# Patient Record
Sex: MALE | Race: WHITE | Employment: FULL TIME | URBAN - METROPOLITAN AREA
[De-identification: names, ages, dates, MRNs, and addresses within clinical notes are randomized per-mention and may not be internally consistent; named-entity substitution may affect disease eponyms.]

---

## 2017-03-09 ENCOUNTER — HOSPITAL ENCOUNTER (EMERGENCY)
Facility: HOSPITAL | Age: 22
Discharge: HOME | End: 2017-03-09
Attending: EMERGENCY MEDICINE
Payer: COMMERCIAL

## 2017-03-09 VITALS
TEMPERATURE: 97 F | HEART RATE: 63 BPM | RESPIRATION RATE: 18 BRPM | DIASTOLIC BLOOD PRESSURE: 51 MMHG | WEIGHT: 195 LBS | OXYGEN SATURATION: 98 % | HEIGHT: 73 IN | SYSTOLIC BLOOD PRESSURE: 127 MMHG | BODY MASS INDEX: 25.84 KG/M2

## 2017-03-09 DIAGNOSIS — M54.41 ACUTE RIGHT-SIDED LOW BACK PAIN WITH RIGHT-SIDED SCIATICA: Primary | ICD-10-CM

## 2017-03-09 PROCEDURE — 99283 EMERGENCY DEPT VISIT LOW MDM: CPT

## 2017-03-09 RX ORDER — PREDNISONE 20 MG/1
60 TABLET ORAL ONCE
Status: COMPLETED | OUTPATIENT
Start: 2017-03-09 | End: 2017-03-09

## 2017-03-09 RX ORDER — PREDNISONE 20 MG/1
60 TABLET ORAL DAILY
Qty: 15 TABLET | Refills: 0 | Status: SHIPPED | OUTPATIENT
Start: 2017-03-09 | End: 2017-03-14

## 2017-03-09 RX ORDER — CYCLOBENZAPRINE HCL 10 MG
10 TABLET ORAL 3 TIMES DAILY PRN
Qty: 20 TABLET | Refills: 0 | Status: SHIPPED | OUTPATIENT
Start: 2017-03-09 | End: 2017-03-16

## 2017-03-09 RX ORDER — IBUPROFEN 600 MG/1
600 TABLET ORAL ONCE
Status: COMPLETED | OUTPATIENT
Start: 2017-03-09 | End: 2017-03-09

## 2017-03-09 RX ORDER — HYDROCODONE BITARTRATE AND ACETAMINOPHEN 5; 325 MG/1; MG/1
1 TABLET ORAL ONCE
Status: COMPLETED | OUTPATIENT
Start: 2017-03-09 | End: 2017-03-09

## 2017-03-09 RX ORDER — CYCLOBENZAPRINE HCL 10 MG
10 TABLET ORAL ONCE
Status: COMPLETED | OUTPATIENT
Start: 2017-03-09 | End: 2017-03-09

## 2017-03-09 RX ORDER — IBUPROFEN 600 MG/1
600 TABLET ORAL EVERY 8 HOURS PRN
Qty: 30 TABLET | Refills: 0 | Status: SHIPPED | OUTPATIENT
Start: 2017-03-09 | End: 2017-03-16

## 2017-03-09 RX ORDER — HYDROCODONE BITARTRATE AND ACETAMINOPHEN 7.5; 325 MG/1; MG/1
1 TABLET ORAL EVERY 6 HOURS PRN
Qty: 20 TABLET | Refills: 0 | Status: SHIPPED | OUTPATIENT
Start: 2017-03-09 | End: 2017-05-28 | Stop reason: ALTCHOICE

## 2017-03-09 NOTE — ED NOTES
Patient medicated as ordered. AVS reviewed. D/C medications gone over with patient, mom and family. Patient and Mom verbalized understanding of D/C instructions. Patient left ambulatory with steady gait in good, stable condition. Mom to drive patient home.

## 2017-03-09 NOTE — ED PROVIDER NOTES
Patient Seen in: [facility] AND [facility] Emergency Department    History   Patient presents with:  Back Pain (musculoskeletal)    Stated Complaint: Back pain from working out yesterday    HPI    50-year-old male presents for complaint of right lower back pain All other systems reviewed and are negative. Positive for stated complaint: Back pain from working out yesterday  Other systems are as noted in HPI. Constitutional and vital signs reviewed.       All other systems reviewed and negative except as noted There is no fever, no bowel or bladder incontinence or urinary retention. No recent spinal surgery or other invasive procedures. No history of IV drug abuse, no saddle anesthesia.  Spinal epidural abscess and cauda equina are low on my differential.  [place]

## 2017-03-09 NOTE — ED INITIAL ASSESSMENT (HPI)
C/o lower right sided back pain x 2 days after lifting weights at the gym---difficulty walking due to pain    Took norco PTA

## 2017-05-28 ENCOUNTER — HOSPITAL ENCOUNTER (OUTPATIENT)
Facility: HOSPITAL | Age: 22
Setting detail: OBSERVATION
Discharge: HOME | End: 2017-05-30
Attending: EMERGENCY MEDICINE | Admitting: HOSPITALIST
Payer: COMMERCIAL

## 2017-05-28 ENCOUNTER — APPOINTMENT (OUTPATIENT)
Dept: CT IMAGING | Facility: HOSPITAL | Age: 22
End: 2017-05-28
Attending: EMERGENCY MEDICINE
Payer: COMMERCIAL

## 2017-05-28 DIAGNOSIS — R11.2 INTRACTABLE VOMITING WITH NAUSEA, UNSPECIFIED VOMITING TYPE: ICD-10-CM

## 2017-05-28 DIAGNOSIS — K52.9 ACUTE COLITIS: Primary | ICD-10-CM

## 2017-05-28 PROCEDURE — 99220 INITIAL OBSERVATION CARE,LEVL III: CPT | Performed by: HOSPITALIST

## 2017-05-28 PROCEDURE — 74177 CT ABD & PELVIS W/CONTRAST: CPT | Performed by: EMERGENCY MEDICINE

## 2017-05-28 RX ORDER — ONDANSETRON 2 MG/ML
INJECTION INTRAMUSCULAR; INTRAVENOUS
Status: COMPLETED
Start: 2017-05-28 | End: 2017-05-28

## 2017-05-28 RX ORDER — HYDROMORPHONE HYDROCHLORIDE 1 MG/ML
0.5 INJECTION, SOLUTION INTRAMUSCULAR; INTRAVENOUS; SUBCUTANEOUS ONCE
Status: COMPLETED | OUTPATIENT
Start: 2017-05-28 | End: 2017-05-28

## 2017-05-28 RX ORDER — ONDANSETRON 2 MG/ML
4 INJECTION INTRAMUSCULAR; INTRAVENOUS ONCE
Status: COMPLETED | OUTPATIENT
Start: 2017-05-28 | End: 2017-05-28

## 2017-05-28 RX ORDER — METOCLOPRAMIDE HYDROCHLORIDE 5 MG/ML
10 INJECTION INTRAMUSCULAR; INTRAVENOUS EVERY 6 HOURS PRN
Status: DISCONTINUED | OUTPATIENT
Start: 2017-05-28 | End: 2017-05-30

## 2017-05-28 RX ORDER — ENOXAPARIN SODIUM 100 MG/ML
40 INJECTION SUBCUTANEOUS DAILY
Status: DISCONTINUED | OUTPATIENT
Start: 2017-05-28 | End: 2017-05-30

## 2017-05-28 RX ORDER — CHOLECALCIFEROL (VITAMIN D3) 125 MCG
5 CAPSULE ORAL NIGHTLY PRN
COMMUNITY

## 2017-05-28 RX ORDER — SODIUM CHLORIDE 9 MG/ML
INJECTION, SOLUTION INTRAVENOUS CONTINUOUS
Status: DISCONTINUED | OUTPATIENT
Start: 2017-05-28 | End: 2017-05-30

## 2017-05-28 RX ORDER — ACETAMINOPHEN 325 MG/1
650 TABLET ORAL EVERY 6 HOURS PRN
Status: DISCONTINUED | OUTPATIENT
Start: 2017-05-28 | End: 2017-05-30

## 2017-05-28 RX ORDER — HYDROCODONE BITARTRATE AND ACETAMINOPHEN 5; 325 MG/1; MG/1
1 TABLET ORAL EVERY 6 HOURS PRN
Status: DISCONTINUED | OUTPATIENT
Start: 2017-05-28 | End: 2017-05-30

## 2017-05-28 RX ORDER — SODIUM CHLORIDE 9 MG/ML
INJECTION, SOLUTION INTRAVENOUS ONCE
Status: COMPLETED | OUTPATIENT
Start: 2017-05-28 | End: 2017-05-28

## 2017-05-28 RX ORDER — PANTOPRAZOLE SODIUM 40 MG/1
40 TABLET, DELAYED RELEASE ORAL
Status: DISCONTINUED | OUTPATIENT
Start: 2017-05-29 | End: 2017-05-30

## 2017-05-28 RX ORDER — ONDANSETRON 2 MG/ML
4 INJECTION INTRAMUSCULAR; INTRAVENOUS EVERY 6 HOURS PRN
Status: DISCONTINUED | OUTPATIENT
Start: 2017-05-28 | End: 2017-05-29

## 2017-05-28 NOTE — ED INITIAL ASSESSMENT (HPI)
Pt reports drinking 5-6 beers yesterday, reports N/V and abd pain since last evening. Reports having chinese food 2 nights ago. Denies diarrhea.

## 2017-05-28 NOTE — PROGRESS NOTES
Pt seen in the ED rm 42. Admission process started. Floor RN to F/U with skin check and admission orders. Lives at home with mom and works at [employer]. Came to ED after vomiting and abd pain all night.

## 2017-05-28 NOTE — ED PROVIDER NOTES
Patient Seen in: [facility] AND [facility] Emergency Department    History   Patient presents with:  Abdomen/Flank Pain (GI/)    Stated Complaint: vomiting, abd pain    HPI    The patient is a 50-year-old male with past history of appendectomy who presents now tenderness  Eyes: Nonicteric sclera, no conjunctival injection  ENT: TMs are clear and flat bilaterally.   There is no posterior pharyngeal erythema  Chest: Clear to auscultation, no tenderness  Cardiovascular: Regular rate and rhythm without murmur  Abdome CBC WITH DIFFERENTIAL WITH PLATELET    Narrative: The following orders were created for panel order CBC WITH DIFFERENTIAL WITH PLATELET.   Procedure                               Abnormality         Status                     --------- better. We are awaiting CT scan. At 12 PM, after taking a small amount of p.o. fluids, the patient has recurrent nausea and persistent epigastric tenderness to palpation. Patient CT scan demonstrates pancolitis.   Will discuss with the on-call medical

## 2017-05-28 NOTE — H&P
[facility] Patient Status:  Emergency    1995 MRN [id number]   Location [address] Attending Jane Martinez MD   [facility] Day # 0 PCP Unknown Pcp     Date:   Negative  Cardiovascular: Negative  Gastrointestinal: Abdominal pain nausea vomiting  Genitourinary:  Negative  Endocrine:  Negative. Immunologic:  Negative. Musculoskeletal:  Negative. Integumentary:  Negative. Neurologic:  Negative.   Psychiatric:  Ne encounter. Assessment and Plan:    Acute pancolitis   No signs of diarrhea at this time, will continue IV fluids for now hold antibiotics unless patient becomes febrile.     Intractable emesis  We will start patient on Zofran and Reglan as needed, initia

## 2017-05-29 PROCEDURE — 99226 SUBSEQUENT OBSERVATION CARE: CPT | Performed by: HOSPITALIST

## 2017-05-29 RX ORDER — ONDANSETRON 2 MG/ML
4 INJECTION INTRAMUSCULAR; INTRAVENOUS EVERY 4 HOURS PRN
Status: DISCONTINUED | OUTPATIENT
Start: 2017-05-29 | End: 2017-05-30

## 2017-05-29 RX ORDER — 0.9 % SODIUM CHLORIDE 0.9 %
VIAL (ML) INJECTION
Status: COMPLETED
Start: 2017-05-29 | End: 2017-05-29

## 2017-05-29 NOTE — PLAN OF CARE
Problem: Patient/Family Goals  Goal: Patient/Family Long Term Goal  Patient’s Long Term Goal: To tolerate a normal diet    Interventions:  - Take medications as prescribed  - Follow up with physician as instructed  - See additional Care Plan goals for spec cultural and social influences on pain and pain management  - Manage/alleviate anxiety  - Utilize distraction and/or relaxation techniques  - Monitor for opioid side effects  - Notify MD/LIP if interventions unsuccessful or patient reports new pain  Outcom

## 2017-05-29 NOTE — PROGRESS NOTES
[facility]  Hospitalist Progress  Note     Trixie Carrizales Patient Status:  Observation      24year old CSN [id number]   Location 514/514-A Attending Baldemar Shaikh MD   Hosp Day # 1 PCP Unknown Pcp     ASSESSMENT/PLAN    Acute c K  4.3  4.4   CL  108  107   CO2  19*  25   ALKPHO  62   --    AST  29   --    ALT  22   --    BILT  1.5*   --    TP  8.6*   --

## 2017-05-29 NOTE — PLAN OF CARE
Problem: Patient/Family Goals  Goal: Patient/Family Long Term Goal  Patient’s Long Term Goal: To tolerate a normal diet    Interventions:  - Take medications as prescribed  - Follow up with physician as instructed  - See additional Care Plan goals for spec

## 2017-05-30 VITALS
OXYGEN SATURATION: 97 % | DIASTOLIC BLOOD PRESSURE: 67 MMHG | HEIGHT: 73 IN | HEART RATE: 53 BPM | SYSTOLIC BLOOD PRESSURE: 122 MMHG | WEIGHT: 210 LBS | TEMPERATURE: 99 F | RESPIRATION RATE: 16 BRPM | BODY MASS INDEX: 27.83 KG/M2

## 2017-05-30 PROCEDURE — 99217 OBSERVATION CARE DISCHARGE: CPT | Performed by: HOSPITALIST

## 2017-05-30 NOTE — PLAN OF CARE
Problem: Patient/Family Goals  Goal: Patient/Family Long Term Goal  Patient’s Long Term Goal: To tolerate a normal diet    Interventions:  - Take medications as prescribed  - Follow up with physician as instructed  - See additional Care Plan goals for spec opioid side effects  - Notify MD/LIP if interventions unsuccessful or patient reports new pain   Outcome: Progressing    Problem: SAFETY ADULT - FALL  Goal: Free from fall injury  INTERVENTIONS:  - Assess pt frequently for physical needs  - Identify cognit

## 2017-05-30 NOTE — DISCHARGE SUMMARY
Stan Butler #[id number]  (22 year old M)       [facility] [id number]         Yanira Rocha MD Physician Signed Hospitalist Progress Notes 5/30/2017 12:23 PM      Expand All Collapse [facility] HOSPITALIST  DISCHARGE SUMMARY        Marlinda Curling P with his primary care physician regarding possible GI referral for consideration of endoscopy to rule out inflammatory bowel disease given that he has had 2 very similar events with imaging findings of pancolitis.  Ulcerative colitis at this point cannot b

## 2017-05-30 NOTE — PLAN OF CARE
Problem: Patient/Family Goals  Goal: Patient/Family Long Term Goal  Patient’s Long Term Goal: To tolerate a normal diet    Interventions:  - Take medications as prescribed  - Follow up with physician as instructed  - See additional Care Plan goals for spec anxiety  - Utilize distraction and/or relaxation techniques  - Monitor for opioid side effects  - Notify MD/LIP if interventions unsuccessful or patient reports new pain   Outcome: Adequate for Discharge    Problem: SAFETY ADULT - FALL  Goal: Free from fal

## 2017-05-30 NOTE — PROGRESS NOTES
[facility] HOSPITALIST  DISCHARGE SUMMARY     Lisa De La Garza Patient Status:  Observation    1995 MRN [id number]   Location [address] [facility] Attending No att. providers found   [address] Day # 2 PCP Unknown Pcp     DATE OF ADMISSION: 2017  DATE OF pancolitis. Ulcerative colitis at this point cannot be ruled out.     PHYSICAL EXAM:    Vital signs:  Temp:  [98.1 °F (36.7 °C)-99.3 °F (37.4 °C)] 98.5 °F (36.9 °C)  Pulse:  [50-59] 53  Resp:  [16-18] 16  BP: (122-144)/(61-68) 122/67 mmHg    Physical Exam:

## 2017-11-23 ENCOUNTER — HOSPITAL ENCOUNTER (EMERGENCY)
Facility: HOSPITAL | Age: 22
Discharge: HOME | End: 2017-11-23
Attending: EMERGENCY MEDICINE
Payer: COMMERCIAL

## 2017-11-23 VITALS
HEIGHT: 73 IN | RESPIRATION RATE: 20 BRPM | BODY MASS INDEX: 27.83 KG/M2 | TEMPERATURE: 97 F | OXYGEN SATURATION: 97 % | WEIGHT: 210 LBS | DIASTOLIC BLOOD PRESSURE: 98 MMHG | HEART RATE: 55 BPM | SYSTOLIC BLOOD PRESSURE: 144 MMHG

## 2017-11-23 DIAGNOSIS — R11.2 NAUSEA AND VOMITING IN ADULT: Primary | ICD-10-CM

## 2017-11-23 DIAGNOSIS — R10.9 ABDOMINAL PAIN, ACUTE: ICD-10-CM

## 2017-11-23 PROCEDURE — 96375 TX/PRO/DX INJ NEW DRUG ADDON: CPT

## 2017-11-23 PROCEDURE — 96361 HYDRATE IV INFUSION ADD-ON: CPT

## 2017-11-23 PROCEDURE — 85025 COMPLETE CBC W/AUTO DIFF WBC: CPT | Performed by: EMERGENCY MEDICINE

## 2017-11-23 PROCEDURE — 99285 EMERGENCY DEPT VISIT HI MDM: CPT

## 2017-11-23 PROCEDURE — 96376 TX/PRO/DX INJ SAME DRUG ADON: CPT

## 2017-11-23 PROCEDURE — 80048 BASIC METABOLIC PNL TOTAL CA: CPT | Performed by: EMERGENCY MEDICINE

## 2017-11-23 PROCEDURE — 96374 THER/PROPH/DIAG INJ IV PUSH: CPT

## 2017-11-23 RX ORDER — ONDANSETRON 4 MG/1
TABLET, ORALLY DISINTEGRATING ORAL
Status: DISCONTINUED
Start: 2017-11-23 | End: 2017-11-23

## 2017-11-23 RX ORDER — MORPHINE SULFATE 4 MG/ML
4 INJECTION, SOLUTION INTRAMUSCULAR; INTRAVENOUS ONCE
Status: COMPLETED | OUTPATIENT
Start: 2017-11-23 | End: 2017-11-23

## 2017-11-23 RX ORDER — ONDANSETRON 2 MG/ML
4 INJECTION INTRAMUSCULAR; INTRAVENOUS ONCE
Status: COMPLETED | OUTPATIENT
Start: 2017-11-23 | End: 2017-11-23

## 2017-11-23 RX ORDER — LORAZEPAM 2 MG/ML
1 INJECTION INTRAMUSCULAR ONCE
Status: COMPLETED | OUTPATIENT
Start: 2017-11-23 | End: 2017-11-23

## 2017-11-23 RX ORDER — ONDANSETRON 4 MG/1
4 TABLET, ORALLY DISINTEGRATING ORAL EVERY 4 HOURS PRN
Qty: 15 TABLET | Refills: 0 | Status: SHIPPED | OUTPATIENT
Start: 2017-11-23

## 2017-11-23 RX ORDER — DICYCLOMINE HCL 20 MG
20 TABLET ORAL 4 TIMES DAILY PRN
Qty: 30 TABLET | Refills: 0 | Status: SHIPPED | OUTPATIENT
Start: 2017-11-23

## 2017-11-23 RX ORDER — ONDANSETRON 2 MG/ML
4 INJECTION INTRAMUSCULAR; INTRAVENOUS ONCE
Status: DISCONTINUED | OUTPATIENT
Start: 2017-11-23 | End: 2017-11-23

## 2017-11-23 RX ORDER — ONDANSETRON 4 MG/1
4 TABLET, ORALLY DISINTEGRATING ORAL ONCE
Status: DISCONTINUED | OUTPATIENT
Start: 2017-11-23 | End: 2017-11-23

## 2017-11-23 NOTE — ED PROVIDER NOTES
Patient Seen in: [facility] AND [facility] Emergency Department    History   Patient presents with:  Abdomen/Flank Pain (GI/)      HPI    Patient presents complaining of abdominal pain, nausea, and vomiting.   He states symptoms started last night and have been Cardiovascular: Intact distal pulses. No murmur heard. Pulmonary/Chest: Effort normal and breath sounds normal. No stridor. No respiratory distress. Abdominal: Soft. He exhibits no distension and no mass. There is tenderness.  There is no rebound an Diagnosis/ Diagnostic Considerations: Cyclical vomiting, gastroenteritis, colitis, nonspecific abdominal pain    Medical Record Review: I personally reviewed available prior medical records for any recent pertinent discharge summaries, testing, and procedu

## 2017-11-23 NOTE — ED INITIAL ASSESSMENT (HPI)
Patient presents via EMS with abdominal pain; patient has a history of colitis. Patient also complaints of nausea and vomiting.

## 2017-11-23 NOTE — ED NOTES
patient vomited while waiting for ride home. Provided with ODT Zofran. Patient stated that he still wants to go home. MD notified.  Patient ambulated from ED with family

## 2017-12-28 ENCOUNTER — HOSPITAL ENCOUNTER (EMERGENCY)
Facility: HOSPITAL | Age: 22
Discharge: HOME | End: 2017-12-29
Payer: COMMERCIAL

## 2017-12-28 ENCOUNTER — APPOINTMENT (OUTPATIENT)
Dept: GENERAL RADIOLOGY | Facility: HOSPITAL | Age: 22
End: 2017-12-28
Payer: COMMERCIAL

## 2017-12-28 VITALS
RESPIRATION RATE: 16 BRPM | TEMPERATURE: 103 F | SYSTOLIC BLOOD PRESSURE: 139 MMHG | DIASTOLIC BLOOD PRESSURE: 82 MMHG | OXYGEN SATURATION: 96 % | WEIGHT: 195 LBS | HEIGHT: 73 IN | BODY MASS INDEX: 25.84 KG/M2 | HEART RATE: 95 BPM

## 2017-12-28 DIAGNOSIS — B34.9 VIRAL SYNDROME: Primary | ICD-10-CM

## 2017-12-28 PROCEDURE — 71020 XR CHEST PA + LAT CHEST (CPT=71020): CPT

## 2017-12-28 PROCEDURE — 99283 EMERGENCY DEPT VISIT LOW MDM: CPT

## 2017-12-28 RX ORDER — ACETAMINOPHEN 500 MG
1000 TABLET ORAL ONCE
Status: COMPLETED | OUTPATIENT
Start: 2017-12-28 | End: 2017-12-28

## 2017-12-28 RX ORDER — BENZONATATE 100 MG/1
100 CAPSULE ORAL 3 TIMES DAILY PRN
Qty: 30 CAPSULE | Refills: 0 | Status: SHIPPED | OUTPATIENT
Start: 2017-12-28 | End: 2018-01-27

## 2017-12-28 RX ORDER — IBUPROFEN 600 MG/1
600 TABLET ORAL EVERY 8 HOURS PRN
Qty: 30 TABLET | Refills: 0 | Status: SHIPPED | OUTPATIENT
Start: 2017-12-28

## 2017-12-28 RX ORDER — IBUPROFEN 600 MG/1
600 TABLET ORAL ONCE
Status: COMPLETED | OUTPATIENT
Start: 2017-12-28 | End: 2017-12-28

## 2017-12-28 RX ORDER — PSEUDOEPHEDRINE HYDROCHLORIDE 30 MG/1
30 TABLET ORAL EVERY 4 HOURS PRN
Qty: 24 TABLET | Refills: 0 | Status: SHIPPED | OUTPATIENT
Start: 2017-12-28

## 2017-12-30 NOTE — ED PROVIDER NOTES
Patient Seen in: [facility] AND [facility] Emergency Department    History   Patient presents with:  Cough/URI      HPI    Patient presents complaining of a cough productive of clear sputum ×2 days with associated fevers.   Associated body aches, chills, headache present. Cardiovascular: Intact distal pulses. No murmur heard. Pulmonary/Chest: Effort normal. No stridor. No respiratory distress. He has no wheezes. He has no rales. Abdominal: Soft. He exhibits no distension. There is no tenderness.    [address] and discussed with the patient and/or caregiver.     Condition upon leaving the department: Stable    Disposition and Plan     Clinical Impression:  Viral syndrome  (primary encounter diagnosis)    Disposition:  Discharge    Follow-up:  Luis Moore

## 2018-02-04 ENCOUNTER — HOSPITAL ENCOUNTER (EMERGENCY)
Facility: HOSPITAL | Age: 23
Discharge: HOME | End: 2018-02-04
Attending: EMERGENCY MEDICINE
Payer: COMMERCIAL

## 2018-02-04 VITALS
OXYGEN SATURATION: 99 % | HEIGHT: 73 IN | HEART RATE: 69 BPM | RESPIRATION RATE: 18 BRPM | DIASTOLIC BLOOD PRESSURE: 82 MMHG | WEIGHT: 185 LBS | TEMPERATURE: 97 F | SYSTOLIC BLOOD PRESSURE: 137 MMHG | BODY MASS INDEX: 24.52 KG/M2

## 2018-02-04 DIAGNOSIS — G43.A0 CYCLICAL VOMITING WITH NAUSEA, INTRACTABILITY OF VOMITING NOT SPECIFIED: Primary | ICD-10-CM

## 2018-02-04 LAB
ANION GAP SERPL CALC-SCNC: 17 MMOL/L (ref 0–18)
BASOPHILS # BLD: 0.1 K/UL (ref 0–0.2)
BASOPHILS NFR BLD: 1 %
BUN SERPL-MCNC: 14 MG/DL (ref 8–20)
BUN/CREAT SERPL: 10.4 (ref 10–20)
CALCIUM SERPL-MCNC: 10 MG/DL (ref 8.5–10.5)
CHLORIDE SERPL-SCNC: 102 MMOL/L (ref 95–110)
CO2 SERPL-SCNC: 21 MMOL/L (ref 22–32)
CREAT SERPL-MCNC: 1.35 MG/DL (ref 0.5–1.5)
EOSINOPHIL # BLD: 0.1 K/UL (ref 0–0.7)
EOSINOPHIL NFR BLD: 1 %
ERYTHROCYTE [DISTWIDTH] IN BLOOD BY AUTOMATED COUNT: 13.7 % (ref 11–15)
GLUCOSE SERPL-MCNC: 112 MG/DL (ref 70–99)
HCT VFR BLD AUTO: 47.4 % (ref 41–52)
HGB BLD-MCNC: 15.7 G/DL (ref 13.5–17.5)
LYMPHOCYTES # BLD: 0.8 K/UL (ref 1–4)
LYMPHOCYTES NFR BLD: 9 %
MCH RBC QN AUTO: 30 PG (ref 27–32)
MCHC RBC AUTO-ENTMCNC: 33.2 G/DL (ref 32–37)
MCV RBC AUTO: 90.4 FL (ref 80–100)
MONOCYTES # BLD: 0.5 K/UL (ref 0–1)
MONOCYTES NFR BLD: 6 %
NEUTROPHILS # BLD AUTO: 7.7 K/UL (ref 1.8–7.7)
NEUTROPHILS NFR BLD: 84 %
OSMOLALITY UR CALC.SUM OF ELEC: 291 MOSM/KG (ref 275–295)
PLATELET # BLD AUTO: 279 K/UL (ref 140–400)
PMV BLD AUTO: 8.2 FL (ref 7.4–10.3)
POTASSIUM SERPL-SCNC: 3.7 MMOL/L (ref 3.3–5.1)
RBC # BLD AUTO: 5.24 M/UL (ref 4.5–5.9)
SODIUM SERPL-SCNC: 140 MMOL/L (ref 136–144)
WBC # BLD AUTO: 9.2 K/UL (ref 4–11)

## 2018-02-04 PROCEDURE — 85025 COMPLETE CBC W/AUTO DIFF WBC: CPT | Performed by: EMERGENCY MEDICINE

## 2018-02-04 PROCEDURE — 96374 THER/PROPH/DIAG INJ IV PUSH: CPT

## 2018-02-04 PROCEDURE — 99284 EMERGENCY DEPT VISIT MOD MDM: CPT

## 2018-02-04 PROCEDURE — 96375 TX/PRO/DX INJ NEW DRUG ADDON: CPT

## 2018-02-04 PROCEDURE — 80048 BASIC METABOLIC PNL TOTAL CA: CPT | Performed by: EMERGENCY MEDICINE

## 2018-02-04 PROCEDURE — 96376 TX/PRO/DX INJ SAME DRUG ADON: CPT

## 2018-02-04 PROCEDURE — 96361 HYDRATE IV INFUSION ADD-ON: CPT

## 2018-02-04 RX ORDER — MORPHINE SULFATE 2 MG/ML
2 INJECTION, SOLUTION INTRAMUSCULAR; INTRAVENOUS ONCE
Status: COMPLETED | OUTPATIENT
Start: 2018-02-04 | End: 2018-02-04

## 2018-02-04 RX ORDER — ONDANSETRON 4 MG/1
4 TABLET, ORALLY DISINTEGRATING ORAL EVERY 4 HOURS PRN
Qty: 15 TABLET | Refills: 0 | Status: SHIPPED | OUTPATIENT
Start: 2018-02-04

## 2018-02-04 RX ORDER — LORAZEPAM 2 MG/ML
0.5 INJECTION INTRAMUSCULAR ONCE
Status: COMPLETED | OUTPATIENT
Start: 2018-02-04 | End: 2018-02-04

## 2018-02-04 RX ORDER — ONDANSETRON 2 MG/ML
4 INJECTION INTRAMUSCULAR; INTRAVENOUS ONCE
Status: COMPLETED | OUTPATIENT
Start: 2018-02-04 | End: 2018-02-04

## 2018-02-04 NOTE — ED PROVIDER NOTES
[facility] Emergency Department    History   Patient presents with:  Vomiting    Stated Complaint: Vomiting    HPI    Patient complains of vomiting, this began last night, non bloody, non billious. not associated with diarrhea. [02/04/18 0952]  SpO2: 99 % [02/04/18 1033]  O2 Device: None (Room air) [02/04/18 1033]    Current:/82   Pulse 69   Temp (!) 96.8 °F (36 °C) (Temporal)   Resp 18   Ht 185.4 cm (6' 1\")   Wt 83.9 kg   SpO2 99%   BMI 24.41 kg/m²   PULSE OX ,nra  GENERA Disposition and Plan     Clinical Impression:  Cyclical vomiting with nausea, intractability of vomiting not specified  (primary encounter diagnosis)    Disposition:  Discharge  2/4/2018  1:51 pm    Follow-up:  No follow-up provider specified

## 2018-02-07 ENCOUNTER — HOSPITAL ENCOUNTER (EMERGENCY)
Facility: HOSPITAL | Age: 23
Discharge: HOME | End: 2018-02-07
Attending: EMERGENCY MEDICINE
Payer: COMMERCIAL

## 2018-02-07 VITALS
HEIGHT: 73 IN | HEART RATE: 61 BPM | SYSTOLIC BLOOD PRESSURE: 124 MMHG | OXYGEN SATURATION: 100 % | RESPIRATION RATE: 18 BRPM | BODY MASS INDEX: 24.52 KG/M2 | TEMPERATURE: 98 F | DIASTOLIC BLOOD PRESSURE: 82 MMHG | WEIGHT: 185 LBS

## 2018-02-07 DIAGNOSIS — R11.15 INTRACTABLE CYCLICAL VOMITING WITH NAUSEA: Primary | ICD-10-CM

## 2018-02-07 LAB
ALBUMIN SERPL BCP-MCNC: 5 G/DL (ref 3.5–4.8)
ALP SERPL-CCNC: 51 U/L (ref 32–100)
ALT SERPL-CCNC: 13 U/L (ref 17–63)
ANION GAP SERPL CALC-SCNC: 13 MMOL/L (ref 0–18)
AST SERPL-CCNC: 19 U/L (ref 15–41)
BASOPHILS # BLD: 0.1 K/UL (ref 0–0.2)
BASOPHILS NFR BLD: 1 %
BILIRUB DIRECT SERPL-MCNC: 0.3 MG/DL (ref 0–0.2)
BILIRUB SERPL-MCNC: 2.5 MG/DL (ref 0.3–1.2)
BUN SERPL-MCNC: 19 MG/DL (ref 8–20)
BUN/CREAT SERPL: 14.1 (ref 10–20)
CALCIUM SERPL-MCNC: 9.9 MG/DL (ref 8.5–10.5)
CHLORIDE SERPL-SCNC: 95 MMOL/L (ref 95–110)
CO2 SERPL-SCNC: 24 MMOL/L (ref 22–32)
CREAT SERPL-MCNC: 1.35 MG/DL (ref 0.5–1.5)
EOSINOPHIL # BLD: 0 K/UL (ref 0–0.7)
EOSINOPHIL NFR BLD: 1 %
ERYTHROCYTE [DISTWIDTH] IN BLOOD BY AUTOMATED COUNT: 13.8 % (ref 11–15)
GLUCOSE SERPL-MCNC: 100 MG/DL (ref 70–99)
HCT VFR BLD AUTO: 46.5 % (ref 41–52)
HGB BLD-MCNC: 15.6 G/DL (ref 13.5–17.5)
LIPASE SERPL-CCNC: 21 U/L (ref 22–51)
LYMPHOCYTES # BLD: 1.6 K/UL (ref 1–4)
LYMPHOCYTES NFR BLD: 28 %
MCH RBC QN AUTO: 30.1 PG (ref 27–32)
MCHC RBC AUTO-ENTMCNC: 33.6 G/DL (ref 32–37)
MCV RBC AUTO: 89.8 FL (ref 80–100)
MONOCYTES # BLD: 0.6 K/UL (ref 0–1)
MONOCYTES NFR BLD: 10 %
NEUTROPHILS # BLD AUTO: 3.6 K/UL (ref 1.8–7.7)
NEUTROPHILS NFR BLD: 61 %
OSMOLALITY UR CALC.SUM OF ELEC: 276 MOSM/KG (ref 275–295)
PLATELET # BLD AUTO: 244 K/UL (ref 140–400)
PMV BLD AUTO: 8.2 FL (ref 7.4–10.3)
POTASSIUM SERPL-SCNC: 3.8 MMOL/L (ref 3.3–5.1)
PROT SERPL-MCNC: 8 G/DL (ref 5.9–8.4)
RBC # BLD AUTO: 5.18 M/UL (ref 4.5–5.9)
SODIUM SERPL-SCNC: 132 MMOL/L (ref 136–144)
WBC # BLD AUTO: 5.9 K/UL (ref 4–11)

## 2018-02-07 PROCEDURE — 96374 THER/PROPH/DIAG INJ IV PUSH: CPT

## 2018-02-07 PROCEDURE — 83690 ASSAY OF LIPASE: CPT | Performed by: EMERGENCY MEDICINE

## 2018-02-07 PROCEDURE — 80076 HEPATIC FUNCTION PANEL: CPT | Performed by: EMERGENCY MEDICINE

## 2018-02-07 PROCEDURE — 99284 EMERGENCY DEPT VISIT MOD MDM: CPT

## 2018-02-07 PROCEDURE — 80048 BASIC METABOLIC PNL TOTAL CA: CPT | Performed by: EMERGENCY MEDICINE

## 2018-02-07 PROCEDURE — 96361 HYDRATE IV INFUSION ADD-ON: CPT

## 2018-02-07 PROCEDURE — 85025 COMPLETE CBC W/AUTO DIFF WBC: CPT | Performed by: EMERGENCY MEDICINE

## 2018-02-07 RX ORDER — ONDANSETRON 4 MG/1
4 TABLET, ORALLY DISINTEGRATING ORAL EVERY 4 HOURS PRN
Qty: 10 TABLET | Refills: 0 | Status: SHIPPED | OUTPATIENT
Start: 2018-02-07 | End: 2018-02-14

## 2018-02-07 RX ORDER — ONDANSETRON 2 MG/ML
4 INJECTION INTRAMUSCULAR; INTRAVENOUS ONCE
Status: COMPLETED | OUTPATIENT
Start: 2018-02-07 | End: 2018-02-07

## 2018-02-07 NOTE — ED INITIAL ASSESSMENT (HPI)
Seen in this ED on Saturday, diagnosed with cyclical vomiting and sent home with zofran. Presents to ED stating nausea is persistent and he ran out of zofran. Reports abd pain. Denies diarrhea.

## 2018-02-07 NOTE — ED PROVIDER NOTES
Patient Seen in: [facility] AND [facility] Emergency Department    History   Patient presents with:  Nausea/vomiting    Stated Complaint: nvd    HPI    Patient is a 44-year-old male presents with intractable nausea and vomiting ×5 days.   Patient states he last u no rashes        ED Course     Labs Reviewed   BASIC METABOLIC PANEL (8) - Abnormal; Notable for the following:        Result Value    Glucose 100 (*)     Sodium 132 (*)     All other components within normal limits   HEPATIC FUNCTION PANEL (7) - Abnormal; diagnosis)    Disposition:  Discharge  2/7/2018  2:35 pm    Follow-up:  [facility]  [phone]              Medications Prescribed:  Current Discharge Medication List    START taking these medications    !! ondanset

## 2021-08-19 ENCOUNTER — APPOINTMENT (OUTPATIENT)
Dept: GENERAL RADIOLOGY | Age: 26
End: 2021-08-19
Attending: PHYSICIAN ASSISTANT

## 2021-08-19 ENCOUNTER — HOSPITAL ENCOUNTER (OUTPATIENT)
Age: 26
Discharge: HOME | End: 2021-08-19
Attending: PHYSICIAN ASSISTANT

## 2021-08-19 VITALS
TEMPERATURE: 97 F | SYSTOLIC BLOOD PRESSURE: 135 MMHG | HEART RATE: 68 BPM | OXYGEN SATURATION: 97 % | DIASTOLIC BLOOD PRESSURE: 85 MMHG | RESPIRATION RATE: 18 BRPM

## 2021-08-19 DIAGNOSIS — R05.9 COUGH: Primary | ICD-10-CM

## 2021-08-19 DIAGNOSIS — R06.00 DYSPNEA, UNSPECIFIED TYPE: ICD-10-CM

## 2021-08-19 LAB — SARS-COV-2 RNA RESP QL NAA+PROBE: NOT DETECTED

## 2021-08-19 PROCEDURE — 99203 OFFICE O/P NEW LOW 30 MIN: CPT

## 2021-08-19 PROCEDURE — 71046 X-RAY EXAM CHEST 2 VIEWS: CPT | Performed by: PHYSICIAN ASSISTANT

## 2021-08-19 NOTE — ED PROVIDER NOTES
Patient Seen in: [facility]    History   Patient presents with:  Cough/URI    Stated Complaint: Soarness; Shortness of Breath; Congestion     HPI    25-year-old male presents with chief complaint of cough. Onset 5 days ago.   Patient reports Used    Alcohol use: Not on file    Drug use: Not on file      Review of Systems    Positive for stated complaint: Soarness; Shortness of Breath; Congestion   Other systems are as noted in HPI. Constitutional and vital signs reviewed.       All other syste movement is intact in all 4 extremities. Patient exhibits normal speech. Skin: Skin is normal to inspection. Warm and dry. No obvious bruising. No obvious rash.     ED Course     Labs Reviewed   RAPID SARS-COV-2 BY PCR - Normal       MDM     PERC negat List

## 2021-08-19 NOTE — ED INITIAL ASSESSMENT (HPI)
Pt presents to the IC with c/o SOB, congestion and muscle soreness since getting the 2nd covid vaccine on Thursday last week. Pt thought he was feeling better on Sunday, but continue to feel SOB, exacerbated by the requirements of his job.  +productive coug

## 2022-02-08 ENCOUNTER — TELEMEDICINE (OUTPATIENT)
Dept: FAMILY MEDICINE CLINIC | Facility: CLINIC | Age: 27
End: 2022-02-08
Payer: COMMERCIAL

## 2022-02-08 DIAGNOSIS — R11.2 NAUSEA AND VOMITING, UNSPECIFIED VOMITING TYPE: ICD-10-CM

## 2022-02-08 DIAGNOSIS — K21.9 GASTROESOPHAGEAL REFLUX DISEASE WITHOUT ESOPHAGITIS: Primary | ICD-10-CM

## 2022-02-08 PROCEDURE — 99202 OFFICE O/P NEW SF 15 MIN: CPT | Performed by: PHYSICIAN ASSISTANT

## 2022-02-08 RX ORDER — OMEPRAZOLE 20 MG/1
20 CAPSULE, DELAYED RELEASE ORAL DAILY PRN
Qty: 90 CAPSULE | Refills: 0 | Status: SHIPPED | OUTPATIENT
Start: 2022-02-08 | End: 2022-05-09

## 2022-02-08 RX ORDER — ONDANSETRON 4 MG/1
4 TABLET, ORALLY DISINTEGRATING ORAL EVERY 4 HOURS PRN
Qty: 20 TABLET | Refills: 0 | Status: SHIPPED | OUTPATIENT
Start: 2022-02-08

## 2022-02-28 ENCOUNTER — TELEMEDICINE (OUTPATIENT)
Dept: FAMILY MEDICINE CLINIC | Facility: CLINIC | Age: 27
End: 2022-02-28

## 2022-02-28 DIAGNOSIS — R19.7 DIARRHEA OF PRESUMED INFECTIOUS ORIGIN: Primary | ICD-10-CM

## 2022-02-28 PROCEDURE — 99441 PHONE E/M BY PHYS 5-10 MIN: CPT | Performed by: PHYSICIAN ASSISTANT

## 2024-10-30 ENCOUNTER — APPOINTMENT (OUTPATIENT)
Dept: GENERAL RADIOLOGY | Age: 29
End: 2024-10-30
Attending: STUDENT IN AN ORGANIZED HEALTH CARE EDUCATION/TRAINING PROGRAM
Payer: MEDICAID

## 2024-10-30 ENCOUNTER — HOSPITAL ENCOUNTER (OUTPATIENT)
Age: 29
Discharge: HOME | End: 2024-10-30
Attending: STUDENT IN AN ORGANIZED HEALTH CARE EDUCATION/TRAINING PROGRAM
Payer: MEDICAID

## 2024-10-30 VITALS
DIASTOLIC BLOOD PRESSURE: 74 MMHG | HEART RATE: 94 BPM | RESPIRATION RATE: 20 BRPM | SYSTOLIC BLOOD PRESSURE: 134 MMHG | OXYGEN SATURATION: 96 % | TEMPERATURE: 99 F

## 2024-10-30 DIAGNOSIS — M54.50 LUMBAR BACK PAIN: Primary | ICD-10-CM
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
## 2024-10-30 PROCEDURE — 99204 OFFICE O/P NEW MOD 45 MIN: CPT

## 2024-10-30 PROCEDURE — 99213 OFFICE O/P EST LOW 20 MIN: CPT

## 2024-10-30 PROCEDURE — 72100 X-RAY EXAM L-S SPINE 2/3 VWS: CPT | Performed by: STUDENT IN AN ORGANIZED HEALTH CARE EDUCATION/TRAINING PROGRAM

## 2024-10-30 PROCEDURE — 72072 X-RAY EXAM THORAC SPINE 3VWS: CPT | Performed by: STUDENT IN AN ORGANIZED HEALTH CARE EDUCATION/TRAINING PROGRAM

## 2024-10-30 RX ORDER — CYCLOBENZAPRINE HCL 5 MG
5 TABLET ORAL 2 TIMES DAILY PRN
Qty: 6 TABLET | Refills: 0 | Status: SHIPPED | OUTPATIENT
Start: 2024-10-30 | End: 2024-10-30

## 2024-10-30 RX ORDER — CYCLOBENZAPRINE HCL 5 MG
5 TABLET ORAL 2 TIMES DAILY PRN
Qty: 6 TABLET | Refills: 0 | Status: SHIPPED | OUTPATIENT
Start: 2024-10-30 | End: 2024-11-02

## 2024-10-30 NOTE — ED INITIAL ASSESSMENT (HPI)
Fell skateboarding 3 days ago, landed on back. Having increased back pain, similar to sciatica per pt. Also reports increased difficulty ambulating.

## 2024-10-30 NOTE — DISCHARGE INSTRUCTIONS
The x-ray of your back showed no broken bones.  However, x-ray imaging cannot assess the tendons, ligaments, muscles, discs, and other structures which could be injured.  There is some narrowing of the lumbar disc at the lower lumbar spine.  I do recommend follow-up with the specialist for reassessment and for further recommendations.  No heavy lifting, no straining, no physical activity until you follow-up.  In the meantime I recommend rest, no heavy lifting, no straining, and I have prescribed muscle relaxers to help reduce any muscle spasm.    Muscle relaxers can cause drowsiness, therefore you should not drive or operate heavy machinery or drink alcohol while taking this medication.

## 2024-10-30 NOTE — ED PROVIDER NOTES
Patient Seen in: [facility]      History     Chief Complaint   Patient presents with    Back Pain     Stated Complaint: lower back pain - injury    Subjective:   HPI    29-year-old male with past medical history of sciatica but patient is unsure of what type of imaging he had previously and states symptoms resolved with unknown medication, who presents with concern for back injury.  Patient reports that 4 days ago, while skateboarding but not from an elevated height, the front of his skateboard hit an unknown object, he was thrown from his skateboard and landed on his right side and then on his back, no head trauma or loss of consciousness or headache.  He denies any pain of any knee joints other than his back pain. He notes some abrasions of the RUE. He has been applying ice and taking ibuprofen to no relief of his symptoms.  He denies any numbness or tingling radiating down the legs and he denies any bowel or bladder incontinence or retention.  He denies any abdominal pain or vomiting or hematuria.  He denies any dyschezia.    Objective:     History reviewed. No pertinent past medical history.           Past Surgical History:   Procedure Laterality Date    Appendectomy      Appendectomy                  Social History     Socioeconomic History    Marital status: Single   Tobacco Use    Smoking status: Every Day     Current packs/day: 1.00     Types: Cigarettes    Smokeless tobacco: Never     Social Drivers of Health     Food Insecurity: High Risk (7/24/2024)    Received from [facility]    Food Insecurity     Have there been times that your food ran out, and you didn't have money to get more?: Yes     Are there times that you worry that this might happen?: Yes   Transportation Needs: Low Risk  (7/24/2024)    Received from [facility]    Transportation Needs     Do you have trouble getting transportation to medical appointments?: No     How do you normally get  to and from your appointments?: Car Service (taxi, Uber, Lyft)              Review of Systems    Positive for stated complaint: lower back pain - injury  Other systems are as noted in HPI.  Constitutional and vital signs reviewed.      All other systems reviewed and negative except as noted above.    Physical Exam     ED Triage Vitals [10/30/24 1611]   /74   Pulse 94   Resp 20   Temp 99.2 °F (37.3 °C)   Temp src Temporal   SpO2 96 %   O2 Device None (Room air)       Current Vitals:   Vital Signs  BP: 134/74  Pulse: 94  Resp: 20  Temp: 99.2 °F (37.3 °C)  Temp src: Temporal    Oxygen Therapy  SpO2: 96 %  O2 Device: None (Room air)        Physical Exam    General: Awake, alert, comfortable on room air, in no distress, tolerating oral secretions, interactive  Pulmonary: Lungs CTA B, no wheezing, no conversational dyspnea  Cardiac: +2 B/L regular PT and DP pulses  GI: Abdomen soft, nontender, nondistended, no rebound, no guarding  Neuro: Symmetrical facial expressions on gross observation, no appreciated anesthesia to the buttocks with light touch  Musculoskeletal: 5/5 B/L plantarflexion and dorsiflexion and knee flexion and extension, passively elevating the right lower extremity exacerbates his back pain but no radiation of pain and no numbness down the leg, no TTP or step-offs of the midline thoracic through lumbar spine, significant pain exacerbation with active flexion of the back and with sidebending to the right in which he points over the midline thoracic and thoracolumbar and lumbar region as well as lower lumbar paraspinal region as to the area of his symptoms, no TTP throughout the back, no ecchymosis or hematomas of the back  HEENT: No periorbital edema or erythema  Skin: No rash or ecchymosis or hematomas of the back, superficial abrasions appreciated at the right upper arm with no surrounding erythema and no active bleeding or lacerations  Psych: Normal mood, normal affect    ED Course   Copy of  radiology part of patient's thoracic spine x-ray:  Narrative  PROCEDURE: XR THORACIC SPINE (3 VIEWS) (CPT=72072)     COMPARISON: None.     INDICATIONS: Pt fell onto back while skateboarding 4 days ago. Pt states has lower back pain and mid back pain.     TECHNIQUE: Thoracic spine radiographs (3 views)     FINDINGS:  COMMENT: Thoracic spinal radiographs have suboptimal sensitivity for fracture detection.  ALIGNMENT: The thoracic kyphosis is preserved.  VERTEBRAL BODIES:   There is no evidence of fracture or radiographically apparent osseous lesion. The vertebral body heights are preserved.  DISC SPACES: Minor scattered degenerative spurring throughout the thoracic spine.  PARASPINAL REGION: No suspicious displacement of the paraspinal lines is appreciated.  OTHER: Included portions of the thorax are grossly unremarkable.              Impression  CONCLUSION:     No radiographically visible acute osseous injury of the thoracic spine.        Dictated by (CST): Eber Dixon MD on 10/30/2024 at 5:06 PM      Finalized by (CST): Eber Dixon MD on 10/30/2024 at 5:07 PM              Exam Ended: 10/30/24 17:03 Last Resulted: 10/30/24 17:07         Radiology report of patient's lumbar spine x-ray:  Narrative  PROCEDURE: XR LUMBAR SPINE (MIN 2 VIEWS) (CPT=72100)     COMPARISON: None.     INDICATIONS: Pt fell onto back while skateboarding 4 days ago. Pt states has lower back pain and mid back pain.     TECHNIQUE: Lumbar spine radiographs (2-3 views)       FINDINGS:     ALIGNMENT: Normal alignment.    VERTEBRAL BODIES:   Normal.  No fracture or bony lesion.  DISC SPACES: Minimal narrowing of the intervertebral disc space at L3-L4.  SACROILIAC JOINTS: Normal.    OTHER: Negative.                Impression  CONCLUSION:  1. No fracture or subluxation.  Minimal intervertebral disc space narrowing at L3-L4.           Dictated by (CST): Sean Carranza MD on 10/30/2024 at 5:06 PM      Finalized by (CST): Sean Carranza MD on  10/30/2024 at 5:07 PM              Exam Ended: 10/30/24 17:03 Last Resulted: 10/30/24 17:07     Janna  [facility]   Patient is awake, alert, comfortable on room air, in no distress, afebrile, patient is neurovascular intact throughout the lower extremities, no obvious step-offs or TTP of the midline thoracic through lumbar spine but pt does report pain at the thoracolumbar junction and lumbar spine region with movement and with his trauma consider possible fracture versus sprain versus strain versus muscle spasm although no obvious reproducible TTP on examination of the back and no hematomas or ecchymosis, with elevation of the right leg pain exacerbates but no radiation down the leg to suspect sciatica, no signs or symptoms of cauda equina syndrome, no rash of the back with no zoster, no sign of cellulitis surrounding superficial abrasions of the right upper arm, he denies any other joint pain or injury, no reported head injury or loss of consciousness  -Per my personal review and interpretation of the patient's thoracic spine and lumbar spine x-rays, I do not appreciate any fractures or malalignment.  This is consistent with my review of the radiology report as copied above.  There is minimal intervertebral disc space narrowing at L3-L4 reported in the radiology read note.  This was discussed with the patient.  -No fracture on x-ray imaging and no point tenderness to indicate advanced imaging at this time.  -We discussed symptomatic management with rest, no heavy lifting, no straining, no sports or skateboarding, ice has not improved his symptoms and given it has been over 48 hours since injury occurred we discussed safely attempting application of heat packs to help reduce muscle spasm, over-the-counter Tyle or ibuprofen as needed for pain control, and rest.  -I have also prescribed Flexeril.  I informed the patient of the side effect of drowsiness and he was instructed not to drink alcohol while taking this medication,  not to drive while take this medication, and not to operate any heavy machinery while taking this medication.  I discontinued the initial prescription and re-prescribed the prescription to include this disclaimer/side effect profile.  I called the pharmacy, who confirms cancellation of the initial prescription, they report that they also include drowsiness in the instructions.  -Patient is aware of the side effect profile as above.  -Per my review of the PDMP, no results were found for the patient and therefore no concern for any opioid or benzodiazepine use.  Patient is not taking any other medications to contraindicate Flexeril or to be concerned for interaction.  -Patient declines work note  -We discussed that x-ray imaging can be a false negative early in the clinical course and x-ray imaging is limited to assessment of bones and cannot assess the tendons, ligaments, muscles, nerves, discs, and other structures which also could be injured.  -I have reached out to the spinal navigator to help coordinate follow-up for patient's symptoms.  However, we discussed that if the patient does not hear from them in 48 hours, I would recommend follow-up with his primary care physician in the next 4 to 5 days for reassessment of his clinical course and for further recommendations.  -ED precautions discussed    Medical Decision Making  Amount and/or Complexity of Data Reviewed  Radiology: ordered and independent interpretation performed.    Risk  OTC drugs.  Prescription drug management.        Disposition and Plan     Clinical Impression:  1. Lumbar back pain    2. Fall, initial encounter         Disposition:  Discharge  10/30/2024  5:31 pm    Follow-up:   Spinal team vs primary care physician in 4 to 5 days      Medications Prescribed:  Discharge Medication List as of 10/30/2024  5:31 PM        START taking these medications    Details   cyclobenzaprine 5 MG Oral Tab Take 1 tablet (5 mg total) by mouth 2 (two) times daily  as needed for Muscle spasms., Normal, Disp-6 tablet, R-0

## 2024-12-10 ENCOUNTER — APPOINTMENT (OUTPATIENT)
Dept: GENERAL RADIOLOGY | Age: 29
End: 2024-12-10
Attending: STUDENT IN AN ORGANIZED HEALTH CARE EDUCATION/TRAINING PROGRAM
Payer: MEDICAID

## 2024-12-10 ENCOUNTER — HOSPITAL ENCOUNTER (OUTPATIENT)
Age: 29
Discharge: HOME | End: 2024-12-10
Attending: STUDENT IN AN ORGANIZED HEALTH CARE EDUCATION/TRAINING PROGRAM
Payer: MEDICAID

## 2024-12-10 ENCOUNTER — SPINE CENTER NAVIGATION (OUTPATIENT)
Age: 29
End: 2024-12-10

## 2024-12-10 VITALS
HEART RATE: 82 BPM | DIASTOLIC BLOOD PRESSURE: 79 MMHG | RESPIRATION RATE: 16 BRPM | OXYGEN SATURATION: 97 % | SYSTOLIC BLOOD PRESSURE: 123 MMHG | TEMPERATURE: 97 F

## 2024-12-10 DIAGNOSIS — M54.40 LOW BACK PAIN WITH SCIATICA, SCIATICA LATERALITY UNSPECIFIED, UNSPECIFIED BACK PAIN LATERALITY, UNSPECIFIED CHRONICITY: Primary | ICD-10-CM

## 2024-12-10 DIAGNOSIS — M54.40 BACK PAIN OF LUMBAR REGION WITH SCIATICA: ICD-10-CM

## 2024-12-10 DIAGNOSIS — M54.40 BACK PAIN OF LUMBAR REGION WITH SCIATICA: Primary | ICD-10-CM

## 2024-12-10 PROCEDURE — 72100 X-RAY EXAM L-S SPINE 2/3 VWS: CPT | Performed by: STUDENT IN AN ORGANIZED HEALTH CARE EDUCATION/TRAINING PROGRAM

## 2024-12-10 PROCEDURE — 72190 X-RAY EXAM OF PELVIS: CPT | Performed by: STUDENT IN AN ORGANIZED HEALTH CARE EDUCATION/TRAINING PROGRAM

## 2024-12-10 PROCEDURE — 99214 OFFICE O/P EST MOD 30 MIN: CPT

## 2024-12-10 PROCEDURE — 99213 OFFICE O/P EST LOW 20 MIN: CPT

## 2024-12-10 RX ORDER — METHYLPREDNISOLONE 4 MG/1
TABLET ORAL
Qty: 1 EACH | Refills: 0 | Status: SHIPPED | OUTPATIENT
Start: 2024-12-10

## 2024-12-10 NOTE — PROGRESS NOTES
Spine Center Referral Navigation Encounter Note    Referred by: Fany Dasilva DO    Imaging: XR Lumbar Spine, XR Thoracic Spine   If imaging done at an external facility, instructed patient to bring disc of MRI to appointment.     Previously Seen Spine Care Providers: None    Referred to: Dg Avalos PA-C in Neurosurgery     Information below is patient reported.     [place]  Are you currently experiencing any of the following symptoms?      No    Is your condition due to an injury that occurred at work or is your care for this condition being coordinated by Worker's Compensation?      No    Are you looking for a second surgical opinion?      No    Have you had surgery on your spine (neck or back) in the last 12 months?      No    In the last several weeks, have you experienced weakness or balance issues that have caused falls or difficulty lifting your legs or feet (lower body) and/or weakness that has caused you to drop items or caused changes in handwriting (upper body)?      Lower body    Have you had an MRI of the lumbar spine (low back) in the last year?      No    : Patient needs to be seen by Surgical team. Does patient require a new visit or established visit?      New patient visit    Are you closer to [place] or [facility]?      [facility]

## 2024-12-10 NOTE — ED INITIAL ASSESSMENT (HPI)
Patient with back injury about a month ago, seen here and given Flexeril   States he felt better and was back to normal activities after a few days  About 6 days ago he developed pain again to the right lower back that radiates into his right leg and calf

## 2024-12-10 NOTE — DISCHARGE INSTRUCTIONS
There are no broken bones but there are some signs of arthritis on x-ray imaging.  As we discussed, x-ray imaging is limited to assessment of bones and cannot assess the tendons, ligaments, muscles, discs and other structures which could be injured.  We discussed that I am concerned that you are applying heat too closely to the skin, on too high of temperature, and too frequently/long, currently no sign of burning of the skin, but are at risk to burn your skin with your current approach.  Therefore, only apply heat when there is a barrier such as a towel between the skin and the heat pack, never too hot, and only 3 times a day for 10 minutes each time.    I recommend rest, no heavy lifting, no straining, until following up with the spinal specialist for reassessment and for further recommendations.  I have sent information to the spinal team and they should call you in 3 business days to help schedule follow-up with the spinal specialist.  It is very important you follow-up with the spinal specialist for reassessment and for further recommendations.    I have also prescribed a steroid called a Medrol Dosepak, to help decrease inflammation with concern for sciatic nerve irritation.  You should never stop this suddenly as this is a taper and can result in life-threatening withdrawal.  If you have any concerns to stop the medication you should speak with the pharmacist or with your primary care physician before discontinuing.  Do not take NSAIDs such as Advil, naproxen, ibuprofen, or other NSAIDs while on this medication as they can interact because stomach irritation.

## 2024-12-10 NOTE — ED PROVIDER NOTES
Patient Seen in: [facility]      History     Chief Complaint   Patient presents with    Sciatica     Stated Complaint: Lower Back Pain    Subjective:   HPI      29-year-old male with no significant past medical history, who presents with recurrent back pain.  I was the physician who assessed the patient on 10/30/2024 when he presented with back pain confined to the lower thoracic through lumbar region following a fall while skateboarding.  He had no fractures on neither thoracic nor on lumbar spine Xray imaging.  He was prescribed Flexeril.  He states his symptoms improved with symptomatic management over 1 to 2 weeks.  However, he states 6 days ago, he is not sure what he was doing, but started to notice low back pain radiating into his right leg.  No weakness or numbness or tingling.  He denies any bowel or bladder incontinence or retention. He reports using a heating pad for pain relief most of the day.    Objective:     No pertinent past medical history.            No pertinent past surgical history.              No pertinent social history.            Review of Systems    Positive for stated complaint: Lower Back Pain  Other systems are as noted in HPI.  Constitutional and vital signs reviewed.      All other systems reviewed and negative except as noted above.    Physical Exam     ED Triage Vitals [12/10/24 0833]   /79   Pulse 82   Resp 16   Temp 97 °F (36.1 °C)   Temp src Oral   SpO2 97 %   O2 Device None (Room air)       Current Vitals:   Vital Signs  BP: 123/79  Pulse: 82  Resp: 16  Temp: 97 °F (36.1 °C)  Temp src: Oral    Oxygen Therapy  SpO2: 97 %  O2 Device: None (Room air)        Physical Exam    General: Awake, alert, comfortable on room air, in no distress, tolerating oral secretions, interactive  Pulmonary: No conversational dyspnea  Cardiac: +2 B/L regular PT and DP pulses  Neuro: Symmetrical facial expressions on gross observation, +2 B/L patellar and achilles  reflexes  Musculoskeletal: 5/5 B/L plantarflexion and dorsiflexion, 5/5 B/L knee flexion and extension, no TTP of the midline thoracic spine with no step-offs, mild TTP of the lower lumbar spine with no step-offs as well as TTP of the lower lumbar paraspinal muscles and right iliac crest and SI joint, with B/L straight leg raise there is back pain but with right sided straight leg raise pain radiates down the right leg, no numbness or tingling  HEENT: No periorbital edema or erythema  Skin: No confluent erythema or warmth or blisters or skin loss of the right low back, but there is areas of lacy erythema, no sign of pilonidal cyst of the back  Psych: Normal mood, normal affect    ED Course   Copy radiology report of patient's lumbar spine x-ray:  Narrative  PROCEDURE: XR LUMBAR SPINE (MIN 2 VIEWS) (CPT=72100)     COMPARISON: [facility], XR LUMBAR SPINE (MIN 2 VIEWS) (CPT=72100), 10/30/2024, 4:51 PM.     INDICATIONS: Pain of the lumbar spine and right sided pelvic pain post fall.     TECHNIQUE: Lumbar spine radiographs (2-3 views)       FINDINGS:     ALIGNMENT: No acute fracture or acute malalignment.  VERTEBRAL BODIES:   Mild levoscoliosis and minimal lumbar spondylosis.  Bilateral facet arthrosis L4-5-S1.  DISC SPACES: Normal.  No significant disc space narrowing.  SACROILIAC JOINTS: Normal.    OTHER: Negative.                Impression  CONCLUSION:  1. Mild levoscoliosis and minimal lumbar spondylosis.  2. No acute fracture or malalignment.           Dictated by (CST): Arnold Tavarez MD on 12/10/2024 at 9:28 AM      Finalized by (CST): Arnold Tavarez MD on 12/10/2024 at 9:31 AM              Exam Ended: 12/10/24 09:21 Last Resulted: 12/10/24 09:31       Copy of radiology report of patient's pelvic x-ray:  Narrative  PROCEDURE: XR PELVIS (COMPLETE MIN 3 VIEWS) (CPT=72190)     COMPARISON: None.     INDICATIONS: Pain of the lumbar spine and right sided pelvic pain post  fall.     TECHNIQUE: AP pelvis and bilateral oblique pelvic views.  FINDINGS:  BONES: No acute fracture or dislocation.  Bilateral symmetric periarticular acetabular dystrophic calcifications suggesting mild degeneration and or chronic posttraumatic sequela.  SOFT TISSUES: Negative. No visible soft tissue swelling.  EFFUSION: None visible.  OTHER: Negative.              Impression  CONCLUSION:  1. No acute fracture or dislocation.  2. Bilateral symmetric periarticular acetabular dystrophic calcifications suggesting mild induration and or chronic posttraumatic sequela.           Dictated by (CST): Arnold Tavarez MD on 12/10/2024 at 9:31 AM      Finalized by (CST): Arnold Tavarez MD on 12/10/2024 at 9:33 AM              Exam Ended: 12/10/24 09:22 Last Resulted: 12/10/24 09:33     MDM   Consider possible fracture from initial trauma that did not show on initial x-ray imaging and therefore we will repeat x-ray imaging versus sprain versus strain versus herniated disc versus sciatica, no signs or symptoms of cauda equina syndrome, no sign of cellulitis, no sign of pilonidal cyst  -Per my personal review and interpretation of patient's x-ray of the lumbar spine and pelvis I do not appreciate any fractures, this is consistent with my review of the radiology report.  This was discussed with the patient as were the incidental findings noted on the radiology report.  -We discussed x-ray imaging is limited to assessment of the bones and cannot assess the tendons, ligaments, discs, nerves, muscles, and other structures which also could be injured.  -We discussed symptomatic management with rest, no heavy lifting, no straining until following up with back specialist for reassessment for further recommendations.  -Patient has been using a heat pad reportedly on high heat and throughout most of the day, there is a rash over the low back suggesting early signs of skin injury, but currently no sign of a burn, no sign of  cellulitis, no palpable abscess or mass. I educated the patient on safe application of heat pack, never using high heat, not for longer than 10 minutes, barrier such as a towel between the skin and heat pack, for only 10 minutes at a time, and no more than 3 times a day.  -Given concern for sciatica, Medrol Dosepak prescribed for inflammation, patient is not a diabetic and has no allergies to medication.  We did discuss that he should not suddenly discontinue the taper as this can result in life-threatening withdrawal, but if he has concerns to stop medication he should first discuss with his primary care physician or with a pharmacist.  -I again emphasized the importance of specialist follow-up, I sent a request to the spinal referral center, and informed the patient that they will call him in 3 business days to help him schedule specialist follow-up.  However, if he does not hear from them, he should call us.      Medical Decision Making  Amount and/or Complexity of Data Reviewed  Radiology: ordered.    Risk  Prescription drug management.        Disposition and Plan     Clinical Impression:  1. Back pain of lumbar region with sciatica         Disposition:  Discharge  12/10/2024  9:46 am    Follow-up:  No follow-up provider specified.        Medications Prescribed:  Discharge Medication List as of 12/10/2024  9:47 AM        START taking these medications    Details   methylPREDNISolone (MEDROL) 4 MG Oral Tablet Therapy Pack Dosepack: take as directed, Normal, Disp-1 each, R-0

## 2024-12-20 ENCOUNTER — HOSPITAL ENCOUNTER (OUTPATIENT)
Age: 29
Discharge: HOME | End: 2024-12-20
Payer: MEDICAID

## 2024-12-20 VITALS
TEMPERATURE: 98 F | OXYGEN SATURATION: 97 % | HEART RATE: 78 BPM | SYSTOLIC BLOOD PRESSURE: 112 MMHG | DIASTOLIC BLOOD PRESSURE: 69 MMHG | RESPIRATION RATE: 18 BRPM

## 2024-12-20 DIAGNOSIS — M54.50 ACUTE LOW BACK PAIN, UNSPECIFIED BACK PAIN LATERALITY, UNSPECIFIED WHETHER SCIATICA PRESENT: Primary | ICD-10-CM

## 2024-12-20 PROCEDURE — 99213 OFFICE O/P EST LOW 20 MIN: CPT

## 2024-12-20 PROCEDURE — 99214 OFFICE O/P EST MOD 30 MIN: CPT

## 2024-12-20 RX ORDER — PREDNISONE 20 MG/1
40 TABLET ORAL DAILY
Qty: 14 TABLET | Refills: 0 | Status: SHIPPED | OUTPATIENT
Start: 2024-12-20 | End: 2024-12-27

## 2024-12-20 RX ORDER — CYCLOBENZAPRINE HCL 10 MG
10 TABLET ORAL 3 TIMES DAILY PRN
Qty: 20 TABLET | Refills: 0 | Status: SHIPPED | OUTPATIENT
Start: 2024-12-20 | End: 2024-12-27

## 2024-12-23 NOTE — ED PROVIDER NOTES
Patient Seen in: [facility]      History     Chief Complaint   Patient presents with    Back Pain     Stated Complaint: lower back pain,    Subjective:   HPI          Objective:     History reviewed. No pertinent past medical history.           Past Surgical History:   Procedure Laterality Date    Appendectomy      Appendectomy                  Social History     Socioeconomic History    Marital status: Single   Tobacco Use    Smoking status: Every Day     Current packs/day: 1.00     Types: Cigarettes    Smokeless tobacco: Never   Vaping Use    Vaping status: Every Day   Substance and Sexual Activity    Alcohol use: Not Currently    Drug use: Not Currently     Social Drivers of Health     Food Insecurity: High Risk (7/24/2024)    Received from [facility]    Food Insecurity     Have there been times that your food ran out, and you didn't have money to get more?: Yes     Are there times that you worry that this might happen?: Yes   Transportation Needs: Low Risk  (7/24/2024)    Received from [facility]    Transportation Needs     Do you have trouble getting transportation to medical appointments?: No     How do you normally get to and from your appointments?: Car Service (taxi, Uber, Lyft)              Review of Systems    Positive for stated complaint: lower back pain,  Other systems are as noted in HPI.  Constitutional and vital signs reviewed.      All other systems reviewed and negative except as noted above.    Physical Exam     ED Triage Vitals [12/20/24 1003]   /69   Pulse 78   Resp 18   Temp 98 °F (36.7 °C)   Temp src Oral   SpO2 97 %   O2 Device None (Room air)       Current Vitals:   No data recorded    Physical Exam  Vitals reviewed.   Constitutional:       General: He is not in acute distress.  Cardiovascular:      Rate and Rhythm: Normal rate and regular rhythm.   Pulmonary:      Effort: Pulmonary effort is normal.      Breath sounds: Normal  breath sounds.   Musculoskeletal:        Back:       Comments: Area of reported pain.  Neg tenderness to palpate.  Neg vertebral tenderness or deformity   Skin:     General: Skin is warm and dry.   Neurological:      General: No focal deficit present.      Mental Status: He is alert and oriented to person, place, and time.      Sensory: No sensory deficit.      Motor: No weakness.   Psychiatric:         Mood and Affect: Mood normal.         Behavior: Behavior normal.             ED Course   Labs Reviewed - No data to display                MDM              Medical Decision Making  29-year-old male presents with right lower back pain.  This differential diagnosis include muscle spasm, sciatic nerve inflammation, disc pathology.  Patient has a long history of chronic back pain.  He had no specific injury.  He also has no primary care doctor at this time.  He reports the pain as radiating into his upper leg.  This is most consistent with sciatic nerve inflammation.  Prescription for prednisone and Flexeril was sent to his pharmacy.  He was given printed instructions for care of of pain.    Risk  OTC drugs.  Prescription drug management.        Disposition and Plan     Clinical Impression:  1. Acute low back pain, unspecified back pain laterality, unspecified whether sciatica present         Disposition:  Discharge  12/20/2024 10:17 am    Follow-up:  No follow-up provider specified.        Medications Prescribed:  Discharge Medication List as of 12/20/2024 10:17 AM        START taking these medications    Details   predniSONE 20 MG Oral Tab Take 2 tablets (40 mg total) by mouth daily for 7 days., Normal, Disp-14 tablet, R-0                 Supplementary Documentation:

## 2025-01-02 ENCOUNTER — OFFICE VISIT (OUTPATIENT)
Dept: SURGERY | Facility: CLINIC | Age: 30
End: 2025-01-02
Payer: MEDICAID

## 2025-01-02 VITALS
SYSTOLIC BLOOD PRESSURE: 116 MMHG | BODY MASS INDEX: 27.83 KG/M2 | HEART RATE: 96 BPM | DIASTOLIC BLOOD PRESSURE: 88 MMHG | WEIGHT: 210 LBS | HEIGHT: 73 IN

## 2025-01-02 DIAGNOSIS — M54.16 LUMBAR RADICULOPATHY: Primary | ICD-10-CM

## 2025-01-02 PROCEDURE — 99204 OFFICE O/P NEW MOD 45 MIN: CPT

## 2025-01-02 RX ORDER — CYCLOBENZAPRINE HCL 10 MG
10 TABLET ORAL 3 TIMES DAILY PRN
Qty: 30 TABLET | Refills: 0 | Status: SHIPPED | OUTPATIENT
Start: 2025-01-02 | End: 2025-01-12

## 2025-01-02 RX ORDER — BUPROPION HYDROCHLORIDE 150 MG/1
150 TABLET, EXTENDED RELEASE ORAL 2 TIMES DAILY
COMMUNITY
Start: 2024-07-24

## 2025-01-02 NOTE — PROGRESS NOTES
[facility] Neurosurgery        [place] [facility]      [address]  [address]  [place] [address]    PHONE  [phone]          FAX  [phone]    [email or web address]      OFFICE CONSULTATION          Derrek Tanner  : 1995   MRN: [id number]    PCP: None Pcp  Referring Provider: No ref. provider found     Insurance: Payor: MEDICAID REPLACEMENT / Plan: [place] [facility] PLANS / Product Type: *No Product type* /            Date of Consult:  2025    Reason for Consultation:  Our patient has been referred to our office for evaluation of:  Right lower extremity pain    History of Present Illness:  Derrek Tanner is a a(n) 29 year old, male presents to the office with a 3-month history of low back and radiating right lower extremity pain.  Patient has been seen in the urgent care 3 times since onset.  Initially, patient was started on oral steroid as well as a muscle relaxer and improved over the course of 2 weeks.  Following that visit he returned to work but his radiating right lower extremity pain worsened again.  He was subsequently seen twice in the  with x-rays completed and provided muscle relaxers.  The pain is similar to onset and has not been improving.  He denies bowel or bladder changes, left lower extremity pain, weakness, numbness.  He is having difficulty sleeping due to the pain.  He is utilizing over-the-counter Tylenol and ibuprofen.        History:  History reviewed. No pertinent past medical history.   Past Surgical History:   Procedure Laterality Date    Appendectomy      Appendectomy       History reviewed. No pertinent family history.   Social History     Socioeconomic History    Marital status: Single     Spouse name: Not on file    Number of children: Not on file    Years of education: Not on file    Highest education level: Not on file   Occupational History    Not on file   Tobacco Use     Smoking status: Every Day     Current packs/day: 1.00     Types: Cigarettes    Smokeless tobacco: Never   Vaping Use    Vaping status: Every Day   Substance and Sexual Activity    Alcohol use: Not Currently    Drug use: Not Currently    Sexual activity: Not on file   Other Topics Concern    Not on file   Social History Narrative    Not on file     Social Drivers of Health     Financial Resource Strain: Not on file   Food Insecurity: High Risk (7/24/2024)    Received from [facility]    Food Insecurity     Have there been times that your food ran out, and you didn't have money to get more?: Yes     Are there times that you worry that this might happen?: Yes   Transportation Needs: Low Risk  (7/24/2024)    Received from [facility]    Transportation Needs     Do you have trouble getting transportation to medical appointments?: No     How do you normally get to and from your appointments?: Car Service (taxi, Uber, Lyft)   Physical Activity: Not on file   Stress: Not on file   Social Connections: Not on file   Housing Stability: Not on file (7/24/2024)        Allergies:  Allergies[1]    Medications:  Current Outpatient Medications   Medication Sig Dispense Refill    buPROPion  MG Oral Tablet 12 Hr Take 1 tablet (150 mg total) by mouth 2 (two) times daily.          Review of Systems:  A 10-point system was reviewed.  Pertinent positives and negatives are noted in HPI.      Physical Exam:  /88 (BP Location: Right arm, Patient Position: Sitting, Cuff Size: adult)   Pulse 96   Ht 73\"   Wt 210 lb (95.3 kg)   BMI 27.71 kg/m²        Neurological Exam:    Motor Strength:  Right lower extremity plantarflexion minus 5 out of 5  Otherwise 5 out of 5 in bilateral lower extremities    Sensation to light touch:  Intact and symmetric in bilateral lower extremities    DTRs:  2+/4 in bilateral lower extremities bilateral lower extremities    Long tract signs:  Negative  mitchell  Negative babinski  Negative clonus      Abdomen:  Soft, non-distended, non-tender, with no rebound or guarding.  No peritoneal signs.     Extremities:  Non-tender, no lower extremity edema noted.      Labs:  CBC:  Lab Results   Component Value Date    WBC 5.9 02/07/2018    HGB 15.6 02/07/2018    HCT 46.5 02/07/2018    MCV 89.8 02/07/2018     02/07/2018      BMG:   Lab Results   Component Value Date     (L) 02/07/2018    K 3.8 02/07/2018    CO2 24 02/07/2018    CL 95 02/07/2018    BUN 19 02/07/2018      INR:   No results found for: \"INR\", \"PROTIME\"     Diagnostics:  X-ray lumbar spine dated 12/10/2024 reviewed:  There is normal alignment of the lumbar spine.  There is no acute fracture or malalignment.  There is no significant disc space narrowing.     Diagnosis:  1. Lumbar radiculopathy  - MRI SPINE LUMBAR (CPT=72148); Future  - PHYSICAL THERAPY - INTERNAL        Assessment/Plan:  Derrek Tanner is a a(n) 29 year old, male, who presents to the office with a 3-month history of radiating right lower extremity pain consistent with a S1 radiculopathy.  Reviewed x-ray lumbar spine which appears within normal limits.  Patient has been seen in the urgent care 3 separate times for this pain.  He has been completing at home exercises to help with the discomfort.  I have provided patient with physical therapy order to be initiated to help with pain control.  I also provided her with an MRI of the lumbar spine as his pain has not been improving and I would like to evaluate for an acute disc herniation.  I have also provided patient with a refill on Flexeril 10 mg.  I will plan to see patient back in the office after imaging is completed.    All questions and concerns were addressed. We appreciate the opportunity to participate in the care of this patient. Please do not hesitate to call our office ([phone]) with any issues.     This report will be submitted to the referring provider.    This note has  been dictated utilizing voice recognition software. Unfortunately, this may lead to occasional typographical errors. If there are any questions regarding this, please do not hesitate to contact our office.         Dg Avalos PA-C    1/2/2025  12:11 PM       [1] No Known Allergies

## 2025-01-02 NOTE — PATIENT INSTRUCTIONS
Refill policies:    Allow 2-3 business days for refills; controlled substances may take longer.  Contact your pharmacy at least 5 days prior to running out of medication and have them send an electronic request or submit request through the “request refill” option in your [employer] account.  Refills are not addressed on weekends; covering physicians do not authorize routine medications on weekends.  No narcotics or controlled substances are refilled after noon on Fridays or by on call physicians.  By law, narcotics must be electronically prescribed.  A 30 day supply with no refills is the maximum allowed.  If your prescription is due for a refill, you may be due for a follow up appointment.  To best provide you care, patients receiving routine medications need to be seen at least once a year.  Patients receiving narcotic/controlled substance medications need to be seen at least once every 3 months.  In the event that your preferred pharmacy does not have the requested medication in stock (e.g. Backordered), it is your responsibility to find another pharmacy that has the requested medication available.  We will gladly send a new prescription to that pharmacy at your request.    Scheduling Tests:    If your physician has ordered radiology tests such as MRI or CT scans, please contact Central Scheduling at [phone] right away to schedule the test.  Once scheduled, the [facility] Centralized Referral Team will work with your insurance carrier to obtain pre-certification or prior authorization.  Depending on your insurance carrier, approval may take 3-10 days.  It is highly recommended patients assure they have received an authorization before having a test performed.  If test is done without insurance authorization, patient may be responsible for the entire amount billed.      Precertification and Prior Authorizations:  If your physician has recommended that you have a procedure or additional testing performed the [facility] hospital  Centralized Referral Team will contact your insurance carrier to obtain pre-certification or prior authorization.    You are strongly encouraged to contact your insurance carrier to verify that your procedure/test has been approved and is a COVERED benefit.  Although the [facility] Centralized Referral Team does its due diligence, the insurance carrier gives the disclaimer that \"Although the procedure is authorized, this does not guarantee payment.\"    Ultimately the patient is responsible for payment.   Thank you for your understanding in this matter.  Paperwork Completion:  If you require FMLA or disability paperwork for your recovery, please make sure to either drop it off or have it faxed to our office at [phone]. Be sure the form has your name and date of birth on it.  The form will be faxed to our Forms Department and they will complete it for you.  There is a 25$ fee for all forms that need to be filled out.  Please be aware there is a 10-14 day turnaround time.  You will need to sign a release of information (GAMALIEL) form if your paperwork does not come with one.  You may call the Forms Department with any questions at [phone].  Their fax number is [phone].

## 2025-01-02 NOTE — PROGRESS NOTES
New patient:  Reason for visit: lower back pain     Estimated time of onset:  10/2024    Numeric Rating Scale:    Pain at Present:  6/10                                                                                                                       Distribution of Pain:    right states he has N/T in the right leg     Past Treatments for Current Pain Condition:     No passed treatments

## 2025-01-03 ENCOUNTER — TELEPHONE (OUTPATIENT)
Dept: SURGERY | Facility: CLINIC | Age: 30
End: 2025-01-03

## 2025-01-03 NOTE — TELEPHONE ENCOUNTER
Patient is requesting to have clinical staff place order for physical therapy. Patient schedule MRI.   Future Appointments   Date Time Provider Department Center   1/29/2025 11:15 AM [facility] RM1 (1.5T WIDE) [facility] EM [place]         LOV 01/02/2025 Assessment/Plan:  Derrek Tanner is a a(n) 29 year old, male, who presents to the office with a 3-month history of radiating right lower extremity pain consistent with a S1 radiculopathy.  Reviewed x-ray lumbar spine which appears within normal limits.  Patient has been seen in the urgent care 3 separate times for this pain.  He has been completing at home exercises to help with the discomfort.  I have provided patient with physical therapy order to be initiated to help with pain control.  I also provided her with an MRI of the lumbar spine as his pain has not been improving and I would like to evaluate for an acute disc herniation.  I have also provided patient with a refill on Flexeril 10 mg.  I will plan to see patient back in the office after imaging is completed.     All questions and concerns were addressed. We appreciate the opportunity to participate in the care of this patient. Please do not hesitate to call our office ([phone]) with any issues.      This report will be submitted to the referring provider.     This note has been dictated utilizing voice recognition software. Unfortunately, this may lead to occasional typographical errors. If there are any questions regarding this, please do not hesitate to contact our office.

## 2025-01-03 NOTE — TELEPHONE ENCOUNTER
Message below noted.    Patient requesting PT order. MRI scheduled for 1/29/25.    Noted PT order in chart.     Advised patient of message and number to call to schedule.    Patient is active on [employer].  message sent.

## 2025-01-06 ENCOUNTER — OFFICE VISIT (OUTPATIENT)
Dept: PHYSICAL THERAPY | Age: 30
End: 2025-01-06
Payer: MEDICAID

## 2025-01-06 DIAGNOSIS — M54.16 LUMBAR RADICULOPATHY: Primary | ICD-10-CM

## 2025-01-06 PROCEDURE — 97161 PT EVAL LOW COMPLEX 20 MIN: CPT

## 2025-01-06 PROCEDURE — 97110 THERAPEUTIC EXERCISES: CPT

## 2025-01-06 NOTE — PROGRESS NOTES
SPINE EVALUATION:     Diagnosis:   Lumbar radiculopathy (M54.16)      Referring Provider: Dg Avalos  Date of Evaluation:    1/6/2025    Precautions:  None Next MD visit:   none scheduled  Date of Surgery: n/a     PATIENT SUMMARY   Derrek Tanner is a 29 year old male who presents to therapy today with complaints of low back pain with right LE pain.  He states that at the end of October had a skateboard accident and the next day woke up with low back pain and some pain down the right leg.  Pain continued for about 2 weeks and finally went to urgent care and was prescribed flexeril.  He felt better and was able to return to work as a .  About 3-4 weeks later, he suddenly got more sharp pain at low back and more pain down the leg. The leg pain goes down the back of the leg and into the calf.  He also reports some numbness at the tips of the toes.  He has been off work again for the past month.  He is still taking the flexeril which helps him sleep at night.  Overall, he feels like he is slowly getting better.  He has used heat 10 minutes 3x/day with some relief and using Icy Hot.  He has an appointment for an MRI on 1/29/24.     Pt describes pain level current 2-3/10, at best 2/10, at worst 8/10 (at night and first thing in the mornings).   Current functional limitations include Sleeping, Work duties, Prolonged standing, prolonged sitting, grocery shopping, lifting    Derrek describes prior level of function Works as a , skate boarding, biking   Pt goals include To not have limitations with his usual activities.  Past medical history was reviewed with Derrek. Significant findings include History of lumbar radiculopathy at 7 years ago      ASSESSMENT  Derrek presents to physical therapy evaluation with primary c/o low back and right LE pain. The results of the objective tests and measures show decreased lumbar AROM, positive straight leg raise, decreased right LE strength.  Functional deficits  include but are not limited to standing, walking, sitting and sleeping.  Signs and symptoms are consistent with diagnosis of lumbar radiculopathy. Pt and PT discussed evaluation findings, pathology, POC and HEP.  Pt voiced understanding and performs HEP correctly without reported pain. Skilled Physical Therapy is medically necessary to address the above impairments and reach functional goals.     OBJECTIVE:   Observation/Posture: Sitting: decreased lumbar lordosis    Lumbar AROM: (* denotes performed with pain)  Flexion: max restriction (to mid-shins)*  Extension: max restriction   Sidebending: R mild restriction*; L WNL*  Rotation: R WNL*; L WNL*    Palpation: Tenderness at L3-L5 spinous processes, right PSIS; no tenderness at gluteus medius or piriformis.    Strength: (* denotes performed with pain)  LE   Hip flexion (L2): R 5/5; L 5/5  Hip abduction: R 4-/5; L 5/5  Hip Extension: not tested   Knee Flexion: R 5/5; L 5/5   Knee extension (L3): R 5/5; L 5/5   DF (L4): R 5/5; L 5/5  Great Toe Ext (L5): R 5/5, L 5/5  PF (S1): R 4/5; L 5/5     Flexibility:   LE   Hip Flexor: R WNL, L WNL  Hamstrings: R mod decr; L mod decr  Piriformis: R WNL; L WNL     Special tests:   Rep flexion: neg  Rep extension: unable to perform due to pain  SLR pos right   Sacral nutation mobilizations: decreased pain and improved lumbar extension following mobilizations      Gait: pt ambulates on level ground with  antalgia, left trunk lean .  Balance: SLS R unable, L WNL    Today’s Treatment and Response:   Pt education was provided on exam findings, treatment diagnosis, treatment plan, expectations, and prognosis. Pt was also provided recommendations for possible soreness after evaluation, modalities as needed [ice/heat], and postural corrections, bed mobility and sleeping positions, use of lumbar roll for sitting. (Ther Act x 12 min)  Patient was instructed in and issued a HEP for: (Ther ex x 8 min)  Access Code: [email or web address]  URL:  [email or web address]  Date: 01/06/2025  - Supine Transversus Abdominis Bracing - Hands on Stomach  - 2 x daily - 7 x weekly - 1 sets - 10 reps - 5 seconds hold  - Supine Sciatic Nerve Glide  - 2 x daily - 7 x weekly - 1 sets - 10 reps    Charges: PT Eval Low Complexity, 1 TA     Total Timed Treatment: 20 min     Total Treatment Time: 45 min     Based on clinical rationale and outcome measures, this evaluation involved Low Complexity decision making due to 1-2 personal factors/comorbidities, 3 body structures involved/activity limitations, and evolving symptoms including changing pain levels.  PLAN OF CARE:    Goals: (to be met in 10 visits)    Not Met Progress Toward Partially Met Met   Pt will improve transversus abdominis recruitment to perform proper isometric contraction without requiring verbal or tactile cuing to promote advancement of therex. [x] [] [] []   Pt will demonstrate good understanding of proper posture and body mechanics to decrease pain and improve spinal safety. [x] [] [] []   Pt will improve lumbar spine AROM flexion to WNL to allow increase ease with bending forward to don shoes. [x] [] [] []   Pt will report improved symptom centralization and absence of radicular symptoms for 3 consecutive days to improve function with ADLs. [x] [] [] []   Pt will demonstrate improved core strength to be able to perform standing and work duties with <4/10 pain. [x] [] [] []   Pt will be independent and compliant with comprehensive HEP to maintain progress achieved in PT [x] [] [] []        Frequency / Duration: Patient will be seen for 2 x/week or a total of 10 visits over a 90 day period. Treatment will include: Manual Therapy, Neuromuscular Re-education, Therapeutic Activities, Therapeutic Exercise, and Home Exercise Program instruction    Education or treatment limitation: None  Rehab Potential:good    Oswestry Disability Index Score  Score: (Patient-Rptd) 60 % (1/6/2025  1:35  PM)      Patient/Family/Caregiver was advised of these findings, precautions, and treatment options and has agreed to actively participate in planning and for this course of care.    Thank you for your referral. Please co-sign or sign and return this letter via fax as soon as possible to [phone]. If you have any questions, please contact me at Dept: [phone]    Sincerely,  Electronically signed by therapist: Noemi Mcdermott PT    Physician's certification required: Yes  I certify the need for these services furnished under this plan of treatment and while under my care.    X___________________________________________________ Date____________________    Certification From: 1/6/2025  To:4/6/2025

## 2025-01-06 NOTE — TELEPHONE ENCOUNTER
Noted that patient has messaged asking for a return to work release letter for 1.7.25.      Letter initiated and pended and routed to MARIE Jaimes.

## 2025-01-10 ENCOUNTER — OFFICE VISIT (OUTPATIENT)
Dept: PHYSICAL THERAPY | Age: 30
End: 2025-01-10
Payer: MEDICAID

## 2025-01-10 PROCEDURE — 97110 THERAPEUTIC EXERCISES: CPT

## 2025-01-10 PROCEDURE — 97140 MANUAL THERAPY 1/> REGIONS: CPT

## 2025-01-10 PROCEDURE — 97112 NEUROMUSCULAR REEDUCATION: CPT

## 2025-01-10 NOTE — PROGRESS NOTES
Patient: Derrek Tanner (29 year old, male) Referring Provider:  Insurance:   Diagnosis: No data recorded Dg Robbie  [facility] MEDICAID   Date of Surgery: No data recorded Next MD visit:  N/A   Precautions:  No data recorded No data recorded Referral Information:    Date of Evaluation: Req: 11, Auth: 11, Exp: 3/7/2025    No data recorded POC Auth Visits:  10       Today's Date   1/10/2025    Subjective  Pt states that he is doing a little better.  He did his HEP and this was helpful.  He was able to sleep better.  Also tried using a lumbar roll. Will be returning to work today.        Pain: 2/10     Objective  See daily treatment table below     Assessment  Pt has had some improvement in pain since last visit.  Reported relief after manual treatment.  Pt demonstrated good understanding of transverse abdominis contractions.        Goals (to be met in 10 visits)   Goals       Therapy Goals     (to be met in 10 visits)     Not Met Progress Toward Partially Met Met   Pt will improve transversus abdominis recruitment to perform proper isometric contraction without requiring verbal or tactile cuing to promote advancement of therex. [x]  []  []  []    Pt will demonstrate good understanding of proper posture and body mechanics to decrease pain and improve spinal safety. [x]  []  []  []    Pt will improve lumbar spine AROM flexion to WNL to allow increase ease with bending forward to don shoes. [x]  []  []  []    Pt will report improved symptom centralization and absence of radicular symptoms for 3 consecutive days to improve function with ADLs. [x]  []  []  []    Pt will demonstrate improved core strength to be able to perform standing and work duties with <4/10 pain. [x]  []  []  []    Pt will be independent and compliant with comprehensive HEP to maintain progress achieved in PT [x]  []  []  []                  Plan  Continue manual treatment for pain relief and progress core stabilization as pt  tolerates.    Treatment Last 3 Visits       1/10/2025   Treatment   Treatment Day 2   Therapeutic Exercise -Right sciatic nerve glide 90/90 10x  -Right single knee to chest stretch 20\" 3x  -Low trunk rotation rocking 1 min  -Right post innominate self rotation with foot on chair 10x     Neuro Re-Ed -Reviewed TA contractions in supine  -Supine TA contractions 5\" 10x  -Supine bent knee fall out with TA 10x R/L     Manual Therapy -Prone sacral nutation mobilizations with deep breathing  -L3-5 cental and unilat PA Grade I-II  -Left side lying: Right post innominate rotation  -MET for R post rotation     Therapeutic Exercise Min 15   Neuro Re-Ed Min 10   Manual Therapy Min 13   Total of Timed Procedures 38   Total of Service Based 0   Total Treatment Time 38         HEP  Access Code: [email or web address]  URL: [email or web address]  Date: 01/06/2025  - Supine Transversus Abdominis Bracing - Hands on Stomach  - 2 x daily - 7 x weekly - 1 sets - 10 reps - 5 seconds hold  - Supine Sciatic Nerve Glide  - 2 x daily - 7 x weekly - 1 sets - 10 reps  Access Code: [email or web address]  URL: [email or web address]  Date: 01/10/2025  - Bent Knee Fallouts  - 1 x daily - 7 x weekly - 1 sets - 10 reps      Charges     1 Ther Ex, 1 NMRe-Ed, 1 Man

## 2025-01-14 ENCOUNTER — TELEPHONE (OUTPATIENT)
Dept: PHYSICAL THERAPY | Age: 30
End: 2025-01-14

## 2025-01-28 ENCOUNTER — TELEPHONE (OUTPATIENT)
Dept: PHYSICAL THERAPY | Age: 30
End: 2025-01-28

## 2025-01-30 ENCOUNTER — HOSPITAL ENCOUNTER (OUTPATIENT)
Dept: MRI IMAGING | Facility: HOSPITAL | Age: 30
Discharge: HOME | End: 2025-01-30
Payer: MEDICAID

## 2025-01-30 DIAGNOSIS — M54.16 LUMBAR RADICULOPATHY: ICD-10-CM

## 2025-01-30 PROCEDURE — 72148 MRI LUMBAR SPINE W/O DYE: CPT

## 2025-02-03 ENCOUNTER — APPOINTMENT (OUTPATIENT)
Dept: PHYSICAL THERAPY | Age: 30
End: 2025-02-03
Payer: MEDICAID

## 2025-02-03 ENCOUNTER — TELEPHONE (OUTPATIENT)
Dept: PHYSICAL THERAPY | Age: 30
End: 2025-02-03

## 2025-02-05 ENCOUNTER — APPOINTMENT (OUTPATIENT)
Dept: PHYSICAL THERAPY | Age: 30
End: 2025-02-05
Payer: MEDICAID

## 2025-02-10 ENCOUNTER — APPOINTMENT (OUTPATIENT)
Dept: PHYSICAL THERAPY | Age: 30
End: 2025-02-10
Payer: MEDICAID

## 2025-08-17 ENCOUNTER — HOSPITAL ENCOUNTER (OUTPATIENT)
Age: 30
Discharge: HOME | End: 2025-08-17

## 2025-08-17 VITALS
SYSTOLIC BLOOD PRESSURE: 124 MMHG | OXYGEN SATURATION: 95 % | HEART RATE: 120 BPM | RESPIRATION RATE: 20 BRPM | DIASTOLIC BLOOD PRESSURE: 82 MMHG | TEMPERATURE: 100 F

## 2025-08-17 DIAGNOSIS — J06.9 UPPER RESPIRATORY TRACT INFECTION, UNSPECIFIED TYPE: Primary | ICD-10-CM

## 2025-08-17 DIAGNOSIS — U07.1 COVID-19: ICD-10-CM

## 2025-08-17 DIAGNOSIS — J98.01 BRONCHOSPASM: ICD-10-CM

## 2025-08-17 LAB
S PYO AG THROAT QL IA.RAPID: NEGATIVE
SARS-COV-2 RNA RESP QL NAA+PROBE: DETECTED

## 2025-08-17 PROCEDURE — 99213 OFFICE O/P EST LOW 20 MIN: CPT

## 2025-08-17 PROCEDURE — 87651 STREP A DNA AMP PROBE: CPT | Performed by: EMERGENCY MEDICINE

## 2025-08-17 RX ORDER — BENZONATATE 100 MG/1
100 CAPSULE ORAL 3 TIMES DAILY PRN
Qty: 30 CAPSULE | Refills: 0 | Status: SHIPPED | OUTPATIENT
Start: 2025-08-17

## 2025-08-17 RX ORDER — ALBUTEROL SULFATE 90 UG/1
INHALANT RESPIRATORY (INHALATION)
Qty: 1 EACH | Refills: 0 | Status: SHIPPED | OUTPATIENT
Start: 2025-08-17

## (undated) NOTE — Clinical Note
Pt ambulated out of the department in NAD by self. Pt's discharge instructions, home care, follow up care and prescription reviewed. Pt verbalized understanding with no questions.

## (undated) NOTE — LETTER
Date & Time: 8/19/2021, 6:39 PM  Patient: Cielo Ozuna  Encounter Provider(s):    MARIE Ruby       To Whom It May Concern:    Robert Valle was seen and treated in our department on 8/19/2021. He may return to work on 8/23/2021.     If you hav

## (undated) NOTE — ED AVS SNAPSHOT
Jonny Matson   MRN: [id number]    Department:  [facility] Emergency Department   Date of Visit:  2/7/2018           Disclosure     Insurance plans vary and the physician(s) referred by the ER may not be covered by your plan.  Please contact y CARE PHYSICIAN AT ONCE OR RETURN IMMEDIATELY TO THE EMERGENCY DEPARTMENT. If you have been prescribed any medication(s), please fill your prescription right away and begin taking the medication(s) as directed.   If you believe that any of the medications

## (undated) NOTE — LETTER
2/8/2022          To Whom It May Concern:    Zelda Reed is currently under my medical care and may not return to work at this time. He may return to work on 02/09/2022. Activity is restricted as follows: none. If you require additional information please contact our office.         Sincerely,    Tru Rivera PA-C          Document generated by:  Tru Rivera PA-C

## (undated) NOTE — IP AVS SNAPSHOT
[address]  [address] [facility] [phone]                Discharge Summary   3/92/6083    Trixie Carrizales           Admission Information        Provider Department    5/28/2017 Dave Quick MD [facility] 5w 40.9 (L) (05/30/17)  91.9 -- -- -- (05/30/17)  190 (05/30/17)  8.2    (05/29/17)  9.1 (05/29/17)  4.50 (05/29/17)  14.3 (05/29/17)  41.4 (05/29/17)  92.0    (05/29/17)  199 (05/29/17)  8.5    (05/28/17)  21.6 (H) (05/28/17)  5.95 (H) (05/28/17)  17.9 (H) ( - If you have concerns related to behavioral health issues or thoughts of harming yourself, contact [address] [place] [address] at [phone].     - If you don’t have insurance, Alessandra Casper has partnered with [facility] [place] [facility] call your provider or healthcare team if you have any questions regarding your medications while at home.          All Other Medications     Melatonin 5 MG Oral Tab

## (undated) NOTE — ED AVS SNAPSHOT
John Leon   MRN: [id number]    Department:  [facility] Emergency Department   Date of Visit:  2/4/2018           Disclosure     Insurance plans vary and the physician(s) referred by the ER may not be covered by your plan.  Please contact y CARE PHYSICIAN AT ONCE OR RETURN IMMEDIATELY TO THE EMERGENCY DEPARTMENT. If you have been prescribed any medication(s), please fill your prescription right away and begin taking the medication(s) as directed.   If you believe that any of the medications

## (undated) NOTE — ED AVS SNAPSHOT
Josefina Jurado   MRN: [id number]    Department:  [facility] Emergency Department   Date of Visit:  12/28/2017           Disclosure     Insurance plans vary and the physician(s) referred by the ER may not be covered by your plan.  Please contact CARE PHYSICIAN AT ONCE OR RETURN IMMEDIATELY TO THE EMERGENCY DEPARTMENT. If you have been prescribed any medication(s), please fill your prescription right away and begin taking the medication(s) as directed.   If you believe that any of the medications

## (undated) NOTE — LETTER
2/28/2022          To Whom It May Concern:    Catherine Colon is currently under my medical care and may not return to work at this time. He may return to work on 03/2/2022. Activity is restricted as follows: none. If you require additional information please contact our office.         Sincerely,    Marion Pak PA-C          Document generated by:  Marion Pak PA-C

## (undated) NOTE — ED AVS SNAPSHOT
[facility] Emergency Department    [address]     [address] [place] [address]    Phone:  [phone]    Fax:  [phone]           Debbie Velasco   MRN: [id number]    Department:  [facility] Emergency Department   Date of Visit:  3/9/ and Class Registration line at [phone] or find a doctor online by visiting [email or web address].    IF THERE IS ANY CHANGE OR WORSENING OF YOUR CONDITION, CALL YOUR PRIMARY CARE PHYSICIAN AT ONCE OR RETURN IMMEDIATELY TO [address] [place] [address].     If

## (undated) NOTE — LETTER
February 4, 2018    Patient: Susie Vidal   Date of Visit: 2/4/2018       To Whom It May Concern:    Preeti Viera was seen and treated in our emergency department on 2/4/2018. He is excused from work for next 2-3 days.     If you have any questions

## (undated) NOTE — ED AVS SNAPSHOT
[facility] Emergency Department    [address] [address]    Phone:  [phone]    Fax:  [phone]           Gareth Salmeron   MRN: [id number]    Department:  [facility] Emergency Department   Date of Visit:  3/9/ ibuprofen 600 MG Tabs   Quantity:  30 tablet   Commonly known as:  MOTRIN   Take 1 tablet (600 mg total) by mouth every 8 (eight) hours as needed for Pain.        predniSONE 20 MG Tabs   Quantity:  15 tablet   Commonly known as:  DELTASONE   Take 3 tabl return to your personal doctor) about any new or lasting problems. The primary care or specialist physician may see patients referred from the [facility] Emergency Department. Follow-up care is at the discretion of that Physician.   If you n Tracy Burtonarmani [address] ([facility]) [phone]   Musa Hidalgo [address] [facility] [employer] ([facility]) [address] [facility] [address] [address]  [place] [address]) [phone]                Additional Information

## (undated) NOTE — ED AVS SNAPSHOT
Yinka Plainville   MRN: [id number]    Department:  [facility] Emergency Department   Date of Visit:  11/23/2017           Disclosure     Insurance plans vary and the physician(s) referred by the ER may not be covered by your plan.  Please contact CARE PHYSICIAN AT ONCE OR RETURN IMMEDIATELY TO THE EMERGENCY DEPARTMENT. If you have been prescribed any medication(s), please fill your prescription right away and begin taking the medication(s) as directed.   If you believe that any of the medications

## (undated) NOTE — LETTER
Date & Time: 12/20/2024, 10:16 AM  Patient: Derrek Tanner  Encounter Provider(s):    Teresita Don APRN       To Whom It May Concern:    Derrek Tanner was seen and treated in our department on 12/20/2024. Please excuse him from work 12/20/2024 and 12/21/2024    If you have any questions or concerns, please do not hesitate to call.      Teresita Don NP  _____________________________  Physician/APC Signature

## (undated) NOTE — LETTER
Date: 1/6/2025    Patient Name: Derrek Tanner          To Whom it may concern:    This letter has been written at the patient's request. The above patient was seen at [facility] for treatment of a medical condition.        The patient may return to work/school on 1.7.25 with the following limitations ***.        Sincerely,    Dg Avalos PA-C